# Patient Record
Sex: FEMALE | Race: WHITE | NOT HISPANIC OR LATINO | Employment: OTHER | URBAN - METROPOLITAN AREA
[De-identification: names, ages, dates, MRNs, and addresses within clinical notes are randomized per-mention and may not be internally consistent; named-entity substitution may affect disease eponyms.]

---

## 2019-05-09 ENCOUNTER — RECORDS - HEALTHEAST (OUTPATIENT)
Dept: LAB | Facility: CLINIC | Age: 74
End: 2019-05-09

## 2019-05-09 LAB
ALBUMIN SERPL-MCNC: 3.7 G/DL (ref 3.5–5)
ALP SERPL-CCNC: 92 U/L (ref 45–120)
ALT SERPL W P-5'-P-CCNC: 20 U/L (ref 0–45)
ANION GAP SERPL CALCULATED.3IONS-SCNC: 9 MMOL/L (ref 5–18)
AST SERPL W P-5'-P-CCNC: 30 U/L (ref 0–40)
BILIRUB SERPL-MCNC: 0.9 MG/DL (ref 0–1)
BUN SERPL-MCNC: 19 MG/DL (ref 8–28)
CALCIUM SERPL-MCNC: 10.2 MG/DL (ref 8.5–10.5)
CHLORIDE BLD-SCNC: 104 MMOL/L (ref 98–107)
CHOLEST SERPL-MCNC: 171 MG/DL
CO2 SERPL-SCNC: 31 MMOL/L (ref 22–31)
CREAT SERPL-MCNC: 0.89 MG/DL (ref 0.6–1.1)
ERYTHROCYTE [DISTWIDTH] IN BLOOD BY AUTOMATED COUNT: 13.2 % (ref 11–14.5)
FASTING STATUS PATIENT QL REPORTED: NORMAL
GFR SERPL CREATININE-BSD FRML MDRD: >60 ML/MIN/1.73M2
GLUCOSE BLD-MCNC: 112 MG/DL (ref 70–125)
HCT VFR BLD AUTO: 42.7 % (ref 35–47)
HDLC SERPL-MCNC: 52 MG/DL
HGB BLD-MCNC: 13.8 G/DL (ref 12–16)
LDLC SERPL CALC-MCNC: 98 MG/DL
MCH RBC QN AUTO: 30.7 PG (ref 27–34)
MCHC RBC AUTO-ENTMCNC: 32.3 G/DL (ref 32–36)
MCV RBC AUTO: 95 FL (ref 80–100)
PLATELET # BLD AUTO: 201 THOU/UL (ref 140–440)
PMV BLD AUTO: 11 FL (ref 8.5–12.5)
POTASSIUM BLD-SCNC: 3.9 MMOL/L (ref 3.5–5)
PROT SERPL-MCNC: 8 G/DL (ref 6–8)
RBC # BLD AUTO: 4.49 MILL/UL (ref 3.8–5.4)
SODIUM SERPL-SCNC: 144 MMOL/L (ref 136–145)
TRIGL SERPL-MCNC: 106 MG/DL
WBC: 7.9 THOU/UL (ref 4–11)

## 2019-05-10 LAB — 25(OH)D3 SERPL-MCNC: 10.7 NG/ML (ref 30–80)

## 2019-06-21 ENCOUNTER — RECORDS - HEALTHEAST (OUTPATIENT)
Dept: LAB | Facility: CLINIC | Age: 74
End: 2019-06-21

## 2019-06-21 LAB — TSH SERPL DL<=0.005 MIU/L-ACNC: 0.55 UIU/ML (ref 0.3–5)

## 2019-11-18 ENCOUNTER — RECORDS - HEALTHEAST (OUTPATIENT)
Dept: LAB | Facility: CLINIC | Age: 74
End: 2019-11-18

## 2019-11-19 ENCOUNTER — RECORDS - HEALTHEAST (OUTPATIENT)
Dept: LAB | Facility: CLINIC | Age: 74
End: 2019-11-19

## 2019-11-19 LAB
25(OH)D3 SERPL-MCNC: 78.6 NG/ML (ref 30–80)
ALBUMIN UR-MCNC: NEGATIVE MG/DL
APPEARANCE UR: ABNORMAL
BACTERIA #/AREA URNS HPF: ABNORMAL HPF
BILIRUB UR QL STRIP: NEGATIVE
COLOR UR AUTO: YELLOW
GLUCOSE UR STRIP-MCNC: NEGATIVE MG/DL
HGB UR QL STRIP: NEGATIVE
HYALINE CASTS #/AREA URNS LPF: ABNORMAL LPF
KETONES UR STRIP-MCNC: NEGATIVE MG/DL
LEUKOCYTE ESTERASE UR QL STRIP: ABNORMAL
MUCOUS THREADS #/AREA URNS LPF: ABNORMAL LPF
NITRATE UR QL: NEGATIVE
PH UR STRIP: 5.5 [PH] (ref 4.5–8)
RBC #/AREA URNS AUTO: ABNORMAL HPF
SP GR UR STRIP: 1.01 (ref 1–1.03)
SQUAMOUS #/AREA URNS AUTO: ABNORMAL LPF
TRANS CELLS #/AREA URNS HPF: ABNORMAL LPF
UROBILINOGEN UR STRIP-ACNC: ABNORMAL
WBC #/AREA URNS AUTO: ABNORMAL HPF
WBC CLUMPS #/AREA URNS HPF: PRESENT /[HPF]

## 2019-11-20 LAB — BACTERIA SPEC CULT: NO GROWTH

## 2020-03-02 ENCOUNTER — RECORDS - HEALTHEAST (OUTPATIENT)
Dept: LAB | Facility: CLINIC | Age: 75
End: 2020-03-02

## 2020-03-02 LAB
ANION GAP SERPL CALCULATED.3IONS-SCNC: 12 MMOL/L (ref 5–18)
BASOPHILS # BLD AUTO: 0 THOU/UL (ref 0–0.2)
BASOPHILS NFR BLD AUTO: 0 % (ref 0–2)
BUN SERPL-MCNC: 20 MG/DL (ref 8–28)
CALCIUM SERPL-MCNC: 9.9 MG/DL (ref 8.5–10.5)
CHLORIDE BLD-SCNC: 107 MMOL/L (ref 98–107)
CO2 SERPL-SCNC: 21 MMOL/L (ref 22–31)
CREAT SERPL-MCNC: 0.86 MG/DL (ref 0.6–1.1)
EOSINOPHIL # BLD AUTO: 0.2 THOU/UL (ref 0–0.4)
EOSINOPHIL NFR BLD AUTO: 2 % (ref 0–6)
ERYTHROCYTE [DISTWIDTH] IN BLOOD BY AUTOMATED COUNT: 14.4 % (ref 11–14.5)
GFR SERPL CREATININE-BSD FRML MDRD: >60 ML/MIN/1.73M2
GLUCOSE BLD-MCNC: 118 MG/DL (ref 70–125)
HCT VFR BLD AUTO: 37.1 % (ref 35–47)
HGB BLD-MCNC: 11.4 G/DL (ref 12–16)
LYMPHOCYTES # BLD AUTO: 0.9 THOU/UL (ref 0.8–4.4)
LYMPHOCYTES NFR BLD AUTO: 12 % (ref 20–40)
MCH RBC QN AUTO: 29.2 PG (ref 27–34)
MCHC RBC AUTO-ENTMCNC: 30.7 G/DL (ref 32–36)
MCV RBC AUTO: 95 FL (ref 80–100)
MONOCYTES # BLD AUTO: 0.7 THOU/UL (ref 0–0.9)
MONOCYTES NFR BLD AUTO: 9 % (ref 2–10)
NEUTROPHILS # BLD AUTO: 5.8 THOU/UL (ref 2–7.7)
NEUTROPHILS NFR BLD AUTO: 77 % (ref 50–70)
PLATELET # BLD AUTO: 196 THOU/UL (ref 140–440)
PMV BLD AUTO: 11.1 FL (ref 8.5–12.5)
POTASSIUM BLD-SCNC: 4.4 MMOL/L (ref 3.5–5)
RBC # BLD AUTO: 3.91 MILL/UL (ref 3.8–5.4)
SODIUM SERPL-SCNC: 140 MMOL/L (ref 136–145)
WBC: 7.5 THOU/UL (ref 4–11)

## 2020-06-19 ENCOUNTER — RECORDS - HEALTHEAST (OUTPATIENT)
Dept: LAB | Facility: CLINIC | Age: 75
End: 2020-06-19

## 2020-06-19 LAB
ANION GAP SERPL CALCULATED.3IONS-SCNC: 9 MMOL/L (ref 5–18)
BUN SERPL-MCNC: 31 MG/DL (ref 8–28)
CALCIUM SERPL-MCNC: 9.3 MG/DL (ref 8.5–10.5)
CHLORIDE BLD-SCNC: 106 MMOL/L (ref 98–107)
CO2 SERPL-SCNC: 26 MMOL/L (ref 22–31)
CREAT SERPL-MCNC: 1.1 MG/DL (ref 0.6–1.1)
GFR SERPL CREATININE-BSD FRML MDRD: 49 ML/MIN/1.73M2
GLUCOSE BLD-MCNC: 70 MG/DL (ref 70–125)
POTASSIUM BLD-SCNC: 4.4 MMOL/L (ref 3.5–5)
SODIUM SERPL-SCNC: 141 MMOL/L (ref 136–145)
URATE SERPL-MCNC: 5.9 MG/DL (ref 2–7.5)

## 2020-09-23 ENCOUNTER — RECORDS - HEALTHEAST (OUTPATIENT)
Dept: LAB | Facility: CLINIC | Age: 75
End: 2020-09-23

## 2020-09-25 LAB
ALBUMIN SERPL-MCNC: 3.5 G/DL (ref 3.5–5)
ALP SERPL-CCNC: 93 U/L (ref 45–120)
ALT SERPL W P-5'-P-CCNC: 13 U/L (ref 0–45)
ANION GAP SERPL CALCULATED.3IONS-SCNC: 10 MMOL/L (ref 5–18)
AST SERPL W P-5'-P-CCNC: 27 U/L (ref 0–40)
BASOPHILS # BLD AUTO: 0 THOU/UL (ref 0–0.2)
BASOPHILS NFR BLD AUTO: 0 % (ref 0–2)
BILIRUB SERPL-MCNC: 0.5 MG/DL (ref 0–1)
BUN SERPL-MCNC: 22 MG/DL (ref 8–28)
CALCIUM SERPL-MCNC: 10 MG/DL (ref 8.5–10.5)
CHLORIDE BLD-SCNC: 102 MMOL/L (ref 98–107)
CHOLEST SERPL-MCNC: 171 MG/DL
CO2 SERPL-SCNC: 29 MMOL/L (ref 22–31)
CREAT SERPL-MCNC: 1.13 MG/DL (ref 0.6–1.1)
EOSINOPHIL # BLD AUTO: 0.1 THOU/UL (ref 0–0.4)
EOSINOPHIL NFR BLD AUTO: 1 % (ref 0–6)
ERYTHROCYTE [DISTWIDTH] IN BLOOD BY AUTOMATED COUNT: 14 % (ref 11–14.5)
FASTING STATUS PATIENT QL REPORTED: ABNORMAL
GFR SERPL CREATININE-BSD FRML MDRD: 47 ML/MIN/1.73M2
GLUCOSE BLD-MCNC: 139 MG/DL (ref 70–125)
HCT VFR BLD AUTO: 37.3 % (ref 35–47)
HDLC SERPL-MCNC: 45 MG/DL
HGB BLD-MCNC: 11.8 G/DL (ref 12–16)
IMM GRANULOCYTES # BLD: 0 THOU/UL
IMM GRANULOCYTES NFR BLD: 0 %
LDLC SERPL CALC-MCNC: 102 MG/DL
LYMPHOCYTES # BLD AUTO: 0.8 THOU/UL (ref 0.8–4.4)
LYMPHOCYTES NFR BLD AUTO: 11 % (ref 20–40)
MCH RBC QN AUTO: 29.9 PG (ref 27–34)
MCHC RBC AUTO-ENTMCNC: 31.6 G/DL (ref 32–36)
MCV RBC AUTO: 94 FL (ref 80–100)
MONOCYTES # BLD AUTO: 0.5 THOU/UL (ref 0–0.9)
MONOCYTES NFR BLD AUTO: 7 % (ref 2–10)
NEUTROPHILS # BLD AUTO: 5.7 THOU/UL (ref 2–7.7)
NEUTROPHILS NFR BLD AUTO: 80 % (ref 50–70)
PLATELET # BLD AUTO: 225 THOU/UL (ref 140–440)
PMV BLD AUTO: 11.1 FL (ref 8.5–12.5)
POTASSIUM BLD-SCNC: 4.3 MMOL/L (ref 3.5–5)
PROT SERPL-MCNC: 8.1 G/DL (ref 6–8)
RBC # BLD AUTO: 3.95 MILL/UL (ref 3.8–5.4)
SODIUM SERPL-SCNC: 141 MMOL/L (ref 136–145)
TRIGL SERPL-MCNC: 118 MG/DL
WBC: 7.1 THOU/UL (ref 4–11)

## 2020-09-28 LAB — 25(OH)D3 SERPL-MCNC: 123 NG/ML (ref 30–80)

## 2021-06-30 ENCOUNTER — RECORDS - HEALTHEAST (OUTPATIENT)
Dept: LAB | Facility: CLINIC | Age: 76
End: 2021-06-30

## 2021-07-01 LAB
25(OH)D3 SERPL-MCNC: 26.8 NG/ML (ref 30–80)
ALBUMIN SERPL-MCNC: 2.7 G/DL (ref 3.5–5)
ALP SERPL-CCNC: 95 U/L (ref 45–120)
ALT SERPL W P-5'-P-CCNC: <9 U/L (ref 0–45)
ANION GAP SERPL CALCULATED.3IONS-SCNC: 12 MMOL/L (ref 5–18)
AST SERPL W P-5'-P-CCNC: 15 U/L (ref 0–40)
BILIRUB DIRECT SERPL-MCNC: 0.1 MG/DL
BILIRUB SERPL-MCNC: 0.3 MG/DL (ref 0–1)
BUN SERPL-MCNC: 12 MG/DL (ref 8–28)
CALCIUM SERPL-MCNC: 9.2 MG/DL (ref 8.5–10.5)
CHLORIDE BLD-SCNC: 107 MMOL/L (ref 98–107)
CO2 SERPL-SCNC: 22 MMOL/L (ref 22–31)
CREAT SERPL-MCNC: 0.72 MG/DL (ref 0.6–1.1)
GFR SERPL CREATININE-BSD FRML MDRD: >60 ML/MIN/1.73M2
GLUCOSE BLD-MCNC: 94 MG/DL (ref 70–125)
POTASSIUM BLD-SCNC: 3.3 MMOL/L (ref 3.5–5)
PROT SERPL-MCNC: 6.6 G/DL (ref 6–8)
SODIUM SERPL-SCNC: 141 MMOL/L (ref 136–145)
TSH SERPL DL<=0.005 MIU/L-ACNC: 1.17 UIU/ML (ref 0.3–5)
VIT B12 SERPL-MCNC: 312 PG/ML (ref 213–816)

## 2021-07-02 ENCOUNTER — RECORDS - HEALTHEAST (OUTPATIENT)
Dept: LAB | Facility: CLINIC | Age: 76
End: 2021-07-02

## 2021-07-06 LAB — POTASSIUM BLD-SCNC: 3.8 MMOL/L (ref 3.5–5)

## 2021-07-15 ENCOUNTER — HOSPITAL ENCOUNTER (INPATIENT)
Facility: HOSPITAL | Age: 76
LOS: 3 days | Discharge: NURSING, REHAB OR LONG-TERM CARE | DRG: 389 | End: 2021-07-19
Attending: EMERGENCY MEDICINE | Admitting: HOSPITALIST
Payer: COMMERCIAL

## 2021-07-15 ENCOUNTER — HOSPITAL ENCOUNTER (EMERGENCY)
Dept: CT IMAGING | Facility: HOSPITAL | Age: 76
DRG: 389 | End: 2021-07-15
Attending: EMERGENCY MEDICINE
Payer: COMMERCIAL

## 2021-07-15 DIAGNOSIS — F02.80 ALZHEIMER'S DEMENTIA WITHOUT BEHAVIORAL DISTURBANCE, UNSPECIFIED TIMING OF DEMENTIA ONSET: ICD-10-CM

## 2021-07-15 DIAGNOSIS — K59.01 SLOW TRANSIT CONSTIPATION: ICD-10-CM

## 2021-07-15 DIAGNOSIS — R11.10 NON-INTRACTABLE VOMITING, PRESENCE OF NAUSEA NOT SPECIFIED, UNSPECIFIED VOMITING TYPE: ICD-10-CM

## 2021-07-15 DIAGNOSIS — G30.9 ALZHEIMER'S DEMENTIA WITHOUT BEHAVIORAL DISTURBANCE, UNSPECIFIED TIMING OF DEMENTIA ONSET: ICD-10-CM

## 2021-07-15 DIAGNOSIS — I10 ESSENTIAL HYPERTENSION: Primary | ICD-10-CM

## 2021-07-15 DIAGNOSIS — N39.0 URINARY TRACT INFECTION WITHOUT HEMATURIA, SITE UNSPECIFIED: ICD-10-CM

## 2021-07-15 LAB
ALBUMIN SERPL-MCNC: 3.3 G/DL (ref 3.5–5)
ALP SERPL-CCNC: 119 U/L (ref 45–120)
ALT SERPL W P-5'-P-CCNC: 9 U/L (ref 0–45)
ANION GAP SERPL CALCULATED.3IONS-SCNC: 10 MMOL/L (ref 5–18)
AST SERPL W P-5'-P-CCNC: 15 U/L (ref 0–40)
BILIRUB DIRECT SERPL-MCNC: 0.2 MG/DL
BILIRUB SERPL-MCNC: 0.8 MG/DL (ref 0–1)
BUN SERPL-MCNC: 12 MG/DL (ref 8–28)
CALCIUM SERPL-MCNC: 9.6 MG/DL (ref 8.5–10.5)
CHLORIDE BLD-SCNC: 105 MMOL/L (ref 98–107)
CO2 SERPL-SCNC: 22 MMOL/L (ref 22–31)
CREAT SERPL-MCNC: 0.7 MG/DL (ref 0.6–1.1)
ERYTHROCYTE [DISTWIDTH] IN BLOOD BY AUTOMATED COUNT: 15 % (ref 10–15)
GFR SERPL CREATININE-BSD FRML MDRD: 85 ML/MIN/1.73M2
GLUCOSE BLD-MCNC: 194 MG/DL (ref 70–125)
HCT VFR BLD AUTO: 41.1 % (ref 35–47)
HGB BLD-MCNC: 13.7 G/DL (ref 11.7–15.7)
LIPASE SERPL-CCNC: 18 U/L (ref 0–52)
MCH RBC QN AUTO: 28.7 PG (ref 26.5–33)
MCHC RBC AUTO-ENTMCNC: 33.3 G/DL (ref 31.5–36.5)
MCV RBC AUTO: 86 FL (ref 78–100)
PLATELET # BLD AUTO: 294 10E3/UL (ref 150–450)
POTASSIUM BLD-SCNC: 3.2 MMOL/L (ref 3.5–5)
PROT SERPL-MCNC: 7.5 G/DL (ref 6–8)
RBC # BLD AUTO: 4.78 10E6/UL (ref 3.8–5.2)
SODIUM SERPL-SCNC: 137 MMOL/L (ref 136–145)
WBC # BLD AUTO: 14.7 10E3/UL (ref 4–11)

## 2021-07-15 PROCEDURE — 96366 THER/PROPH/DIAG IV INF ADDON: CPT

## 2021-07-15 PROCEDURE — 96367 TX/PROPH/DG ADDL SEQ IV INF: CPT

## 2021-07-15 PROCEDURE — 96361 HYDRATE IV INFUSION ADD-ON: CPT

## 2021-07-15 PROCEDURE — 84295 ASSAY OF SERUM SODIUM: CPT | Performed by: EMERGENCY MEDICINE

## 2021-07-15 PROCEDURE — 36592 COLLECT BLOOD FROM PICC: CPT | Performed by: EMERGENCY MEDICINE

## 2021-07-15 PROCEDURE — 99285 EMERGENCY DEPT VISIT HI MDM: CPT | Mod: 25

## 2021-07-15 PROCEDURE — 85027 COMPLETE CBC AUTOMATED: CPT | Performed by: EMERGENCY MEDICINE

## 2021-07-15 PROCEDURE — 250N000011 HC RX IP 250 OP 636: Performed by: EMERGENCY MEDICINE

## 2021-07-15 PROCEDURE — 74177 CT ABD & PELVIS W/CONTRAST: CPT

## 2021-07-15 PROCEDURE — 96365 THER/PROPH/DIAG IV INF INIT: CPT

## 2021-07-15 PROCEDURE — 83690 ASSAY OF LIPASE: CPT | Performed by: EMERGENCY MEDICINE

## 2021-07-15 PROCEDURE — 82374 ASSAY BLOOD CARBON DIOXIDE: CPT | Performed by: EMERGENCY MEDICINE

## 2021-07-15 PROCEDURE — 36415 COLL VENOUS BLD VENIPUNCTURE: CPT | Performed by: EMERGENCY MEDICINE

## 2021-07-15 PROCEDURE — 82248 BILIRUBIN DIRECT: CPT | Performed by: EMERGENCY MEDICINE

## 2021-07-15 RX ORDER — IOPAMIDOL 755 MG/ML
100 INJECTION, SOLUTION INTRAVASCULAR ONCE
Status: COMPLETED | OUTPATIENT
Start: 2021-07-15 | End: 2021-07-15

## 2021-07-15 RX ADMIN — IOPAMIDOL 100 ML: 755 INJECTION, SOLUTION INTRAVENOUS at 22:47

## 2021-07-16 PROBLEM — R11.10 NON-INTRACTABLE VOMITING, PRESENCE OF NAUSEA NOT SPECIFIED, UNSPECIFIED VOMITING TYPE: Status: ACTIVE | Noted: 2021-07-16

## 2021-07-16 LAB
ALBUMIN UR-MCNC: 30 MG/DL
APPEARANCE UR: ABNORMAL
BACTERIA #/AREA URNS HPF: ABNORMAL /HPF
BILIRUB UR QL STRIP: NEGATIVE
COLOR UR AUTO: YELLOW
GLUCOSE UR STRIP-MCNC: NEGATIVE MG/DL
HGB UR QL STRIP: ABNORMAL
KETONES UR STRIP-MCNC: NEGATIVE MG/DL
LACTATE SERPL-SCNC: 0.8 MMOL/L (ref 0.7–2)
LEUKOCYTE ESTERASE UR QL STRIP: ABNORMAL
NITRATE UR QL: NEGATIVE
PH UR STRIP: 8 [PH] (ref 5–7)
RBC URINE: 86 /HPF
SARS-COV-2 RNA RESP QL NAA+PROBE: NEGATIVE
SP GR UR STRIP: 1.04 (ref 1–1.03)
UROBILINOGEN UR STRIP-MCNC: <2 MG/DL
WBC CLUMPS #/AREA URNS HPF: PRESENT /HPF
WBC URINE: 41 /HPF

## 2021-07-16 PROCEDURE — 250N000011 HC RX IP 250 OP 636: Performed by: INTERNAL MEDICINE

## 2021-07-16 PROCEDURE — 258N000003 HC RX IP 258 OP 636: Performed by: STUDENT IN AN ORGANIZED HEALTH CARE EDUCATION/TRAINING PROGRAM

## 2021-07-16 PROCEDURE — 87086 URINE CULTURE/COLONY COUNT: CPT | Performed by: EMERGENCY MEDICINE

## 2021-07-16 PROCEDURE — 83605 ASSAY OF LACTIC ACID: CPT | Performed by: INTERNAL MEDICINE

## 2021-07-16 PROCEDURE — 99222 1ST HOSP IP/OBS MODERATE 55: CPT | Performed by: HOSPITALIST

## 2021-07-16 PROCEDURE — 250N000011 HC RX IP 250 OP 636: Performed by: HOSPITALIST

## 2021-07-16 PROCEDURE — 81001 URINALYSIS AUTO W/SCOPE: CPT | Performed by: EMERGENCY MEDICINE

## 2021-07-16 PROCEDURE — 36415 COLL VENOUS BLD VENIPUNCTURE: CPT | Performed by: INTERNAL MEDICINE

## 2021-07-16 PROCEDURE — 120N000001 HC R&B MED SURG/OB

## 2021-07-16 PROCEDURE — 250N000011 HC RX IP 250 OP 636: Performed by: EMERGENCY MEDICINE

## 2021-07-16 PROCEDURE — 258N000001 HC RX 258: Performed by: EMERGENCY MEDICINE

## 2021-07-16 PROCEDURE — 99221 1ST HOSP IP/OBS SF/LOW 40: CPT | Performed by: SURGERY

## 2021-07-16 PROCEDURE — 87635 SARS-COV-2 COVID-19 AMP PRB: CPT | Performed by: STUDENT IN AN ORGANIZED HEALTH CARE EDUCATION/TRAINING PROGRAM

## 2021-07-16 RX ORDER — ONDANSETRON 2 MG/ML
4 INJECTION INTRAMUSCULAR; INTRAVENOUS EVERY 6 HOURS PRN
Status: DISCONTINUED | OUTPATIENT
Start: 2021-07-16 | End: 2021-07-19 | Stop reason: HOSPADM

## 2021-07-16 RX ORDER — ACETAMINOPHEN 325 MG/1
650 TABLET ORAL EVERY 4 HOURS PRN
Status: DISCONTINUED | OUTPATIENT
Start: 2021-07-16 | End: 2021-07-17

## 2021-07-16 RX ORDER — NITROGLYCERIN 0.4 MG/1
0.4 TABLET SUBLINGUAL EVERY 5 MIN PRN
COMMUNITY

## 2021-07-16 RX ORDER — LIDOCAINE 40 MG/G
CREAM TOPICAL
Status: DISCONTINUED | OUTPATIENT
Start: 2021-07-16 | End: 2021-07-19 | Stop reason: HOSPADM

## 2021-07-16 RX ORDER — DEXTROSE MONOHYDRATE, SODIUM CHLORIDE, AND POTASSIUM CHLORIDE 50; 2.98; 4.5 G/1000ML; G/1000ML; G/1000ML
INJECTION, SOLUTION INTRAVENOUS CONTINUOUS
Status: DISCONTINUED | OUTPATIENT
Start: 2021-07-16 | End: 2021-07-16 | Stop reason: CLARIF

## 2021-07-16 RX ORDER — ONDANSETRON 4 MG/1
4 TABLET, ORALLY DISINTEGRATING ORAL EVERY 6 HOURS PRN
Status: DISCONTINUED | OUTPATIENT
Start: 2021-07-16 | End: 2021-07-19 | Stop reason: HOSPADM

## 2021-07-16 RX ORDER — CEFTRIAXONE 1 G/1
1 INJECTION, POWDER, FOR SOLUTION INTRAMUSCULAR; INTRAVENOUS EVERY 24 HOURS
Status: DISCONTINUED | OUTPATIENT
Start: 2021-07-16 | End: 2021-07-19 | Stop reason: HOSPADM

## 2021-07-16 RX ORDER — DONEPEZIL HYDROCHLORIDE 5 MG/1
5 TABLET, FILM COATED ORAL EVERY MORNING
COMMUNITY
End: 2021-10-13

## 2021-07-16 RX ORDER — MEMANTINE HYDROCHLORIDE 10 MG/1
20 TABLET ORAL EVERY MORNING
Status: ON HOLD | COMMUNITY
End: 2021-07-19

## 2021-07-16 RX ORDER — SERTRALINE HYDROCHLORIDE 100 MG/1
100 TABLET, FILM COATED ORAL DAILY
COMMUNITY

## 2021-07-16 RX ORDER — POLYETHYLENE GLYCOL 3350 17 G/17G
17 POWDER, FOR SOLUTION ORAL DAILY PRN
Status: ON HOLD | COMMUNITY
End: 2021-07-19

## 2021-07-16 RX ORDER — SODIUM CHLORIDE AND POTASSIUM CHLORIDE 150; 900 MG/100ML; MG/100ML
INJECTION, SOLUTION INTRAVENOUS CONTINUOUS
Status: DISCONTINUED | OUTPATIENT
Start: 2021-07-16 | End: 2021-07-16

## 2021-07-16 RX ORDER — ACETAMINOPHEN 500 MG
1000 TABLET ORAL EVERY 8 HOURS PRN
COMMUNITY

## 2021-07-16 RX ORDER — SODIUM CHLORIDE 9 MG/ML
INJECTION, SOLUTION INTRAVENOUS ONCE
Status: COMPLETED | OUTPATIENT
Start: 2021-07-16 | End: 2021-07-16

## 2021-07-16 RX ORDER — ASPIRIN 81 MG/1
81 TABLET, CHEWABLE ORAL DAILY
COMMUNITY

## 2021-07-16 RX ORDER — HYDRALAZINE HYDROCHLORIDE 20 MG/ML
10 INJECTION INTRAMUSCULAR; INTRAVENOUS EVERY 6 HOURS PRN
Status: DISCONTINUED | OUTPATIENT
Start: 2021-07-16 | End: 2021-07-19 | Stop reason: HOSPADM

## 2021-07-16 RX ADMIN — SODIUM CHLORIDE: 9 INJECTION, SOLUTION INTRAVENOUS at 00:48

## 2021-07-16 RX ADMIN — ENOXAPARIN SODIUM 40 MG: 100 INJECTION SUBCUTANEOUS at 18:00

## 2021-07-16 RX ADMIN — POTASSIUM CHLORIDE AND SODIUM CHLORIDE: 900; 150 INJECTION, SOLUTION INTRAVENOUS at 06:03

## 2021-07-16 RX ADMIN — POTASSIUM CHLORIDE: 2 INJECTION, SOLUTION, CONCENTRATE INTRAVENOUS at 10:29

## 2021-07-16 RX ADMIN — POTASSIUM CHLORIDE: 2 INJECTION, SOLUTION, CONCENTRATE INTRAVENOUS at 23:59

## 2021-07-16 RX ADMIN — CEFTRIAXONE SODIUM 1 G: 1 INJECTION, POWDER, FOR SOLUTION INTRAMUSCULAR; INTRAVENOUS at 19:07

## 2021-07-16 ASSESSMENT — ACTIVITIES OF DAILY LIVING (ADL): DEPENDENT_IADLS:: CLEANING;COOKING;LAUNDRY;SHOPPING;MEAL PREPARATION;MEDICATION MANAGEMENT;TRANSPORTATION

## 2021-07-16 NOTE — ED NOTES
Pt's sister updated that we will be boarding the patient here tonight, will call with updates.  Pt's room lights dimmed to facilitate sleep.

## 2021-07-16 NOTE — PROGRESS NOTES
Patient was seen and examined. She appeared confused and not able to tell how she feels. She appears comfortable. CT scan reveals moderate to marked diffuse gaseous distention of the entire colon, all the way to the inferior rectum. This could either represent a colonic ileus or a distal colonic obstruction without visualized cause at the level of the inferior rectum.    Will keep NPO and IVF. Surgery input pending. Rest per initial H&P.    [facility] medicine service  Eli Sosa MD

## 2021-07-16 NOTE — CONSULTS
[facility] CONSULTATION    Liza GUTIÉRREZ Doug   [address] [place] [address]  76 year old female  Admission Date/Time: 7/15/2021  9:21 PM    Primary Care Provider:  Jenna Montoya MD    Requesting Physician: No att. providers found      CHIEF COMPLAINT:   Nausea and vomiting.    REASON FOR THE CONSULT: Nausea vomiting, CT showing colonic distention.    HPI:     This is a severely demented woman who is unable to answer any questions who developed nausea and vomiting and was transferred for a memory care unit.  CT did show moderate distention.  The patient has no complaints of pain.  I am able to push on her belly without any reaction.  Bowel sounds are positive.  She is very sedentary at her nursing home facility.  It appears she does have a UTI.  Her potassium is low.  We are unable to get a history of what her bowel movements have been like.  I am unable to get any further history from the patient.  Her white count is elevated 14,700.  Potassium is 3.2.  UA does show bacteria and white cells.    REVIEW OF SYSTEMS: 13 point review of systems is negative except that noted above.    PAST MEDICAL HISTORY: History reviewed. No pertinent past medical history.    PAST SURGICAL HISTORY: History reviewed. No pertinent surgical history.    FAMILY HISTORY: History reviewed. No pertinent family history.    SOCIAL HISTORY:   Social History     Tobacco Use     Smoking status: Not on file   Substance Use Topics     Alcohol use: Not on file        MEDS:  Medications Prior to Admission   Medication Sig Dispense Refill Last Dose     aspirin (ASA) 81 MG chewable tablet Take 81 mg by mouth daily   7/15/2021 at am     donepezil (ARICEPT) 5 MG tablet Take 5 mg by mouth every morning   7/15/2021 at am     melatonin 5 MG tablet Take 5 mg by mouth At Bedtime   7/14/2021 at PM     memantine (NAMENDA) 10 MG tablet Take 20 mg by mouth every morning   7/15/2021 at am     sertraline (ZOLOFT) 100 MG tablet Take 100 mg  by mouth daily   7/15/2021 at am     acetaminophen (TYLENOL) 500 MG tablet Take 1,000 mg by mouth every 8 hours as needed for mild pain   Unknown at Unknown time     nitroGLYcerin (NITROSTAT) 0.4 MG sublingual tablet Place 0.4 mg under the tongue every 5 minutes as needed for chest pain For chest pain place 1 tablet under the tongue every 5 minutes for 3 doses. If symptoms persist 5 minutes after 1st dose call 911.   Unknown at Unknown time     polyethylene glycol (MIRALAX) 17 g packet Take 17 g by mouth daily as needed for constipation   Unknown at Unknown time       ALLERGIES/SENSITIVITIES: Patient has no known allergies.    MEDICATIONS:  No current outpatient medications on file.       PHYSICAL EXAM:  Temp:  [98.7  F (37.1  C)-99.3  F (37.4  C)] 98.7  F (37.1  C)  Pulse:  [57-82] 62  Resp:  [18-22] 22  BP: (128-236)/() 173/72  SpO2:  [85 %-99 %] 94 %  There is no height or weight on file to calculate BMI.  GEN: Well developed, well nourished 76 year old in no acute distress.  HEENT: sclera anicteric, moist mucous membranes.   LYMPH: No cervical lymphadenopathy  PULM: Nonlabored breathing. Breath sounds equal.   CARDIO: Regular rate  GI: Non-distended. Soft.  Non-tender to palpation.  No guarding. No rebound tenderness.  EXT: warm, no lower extremity edema  NEURO: Alert. No focal defects.   PSYCH: Mental status appropriate, mood and affect normal.    SKIN: No rashes  MSK: No joint abnormalities    LABORATORY DATA:  CBC RESULTS:   Recent Labs   Lab Test 07/15/21  2154   WBC 14.7*   RBC 4.78   HGB 13.7   HCT 41.1   MCV 86   MCH 28.7   MCHC 33.3   RDW 15.0           CMP Results:   Recent Labs   Lab Test 07/15/21  2154      POTASSIUM 3.2*   CHLORIDE 105   CO2 22   ANIONGAP 10   *   BUN 12   CR 0.70   BILITOTAL 0.8   ALKPHOS 119   ALT 9   AST 15        INR Results: No results for input(s): INR in the last [id number] hours.       RELEVANT IMAGING:      Reviewed    ASSESSMENT:     Mild ileus in a  patient with UTI and decreased potassium.  She is also immobile.  No acute abdomen.  Would treat the UTI and potassium and continue the change the position of the patient to make sure she is on the left side, right side, back and sitting in a chair.  Continue to follow.  If she decompensates at all we can intervene.    PLAN:    The above               Jay Rank  Thank you for the opportunity to participate in the care of this patient.   Please feel free to call me with any questions or concerns.  Phone number [phone].            CC: [facility], Jenna Montoya MD

## 2021-07-16 NOTE — PROGRESS NOTES
Patient admitted in the unit from ED at 1330 via cart.   Alert. Unable to assess orientation. Incomprehensible, garbled words.   Placed on 2L O2 per nasal cannula - SAO2 85% --> 94%. VSS, except for BP.  Resistive with cares - screams and very stiff. Consolable.  x1 large incontinent urine, x1 incontinent stool. Purewick applied.  NPO. IV fluids infusing.   Triggered sepsis protocol - ordered, ongoing.  Andrea Brice, RN  [id number]

## 2021-07-16 NOTE — PHARMACY-ADMISSION MEDICATION HISTORY
Pharmacy Note - Admission Medication History    Pertinent Provider Information: patient completed a scheduled course of loperamid on 7/15/21     ______________________________________________________________________    Prior To Admission (PTA) med list completed and updated in EMR.       Prior to Admission Medications   Prescriptions Last Dose Informant Patient Reported? Taking?   acetaminophen (TYLENOL) 500 MG tablet Unknown at Unknown time  Yes No   Sig: Take 1,000 mg by mouth every 8 hours as needed for mild pain   aspirin (ASA) 81 MG chewable tablet 7/15/2021 at am  Yes Yes   Sig: Take 81 mg by mouth daily   donepezil (ARICEPT) 5 MG tablet 7/15/2021 at am  Yes Yes   Sig: Take 5 mg by mouth every morning   melatonin 5 MG tablet 7/14/2021 at PM  Yes Yes   Sig: Take 5 mg by mouth At Bedtime   memantine (NAMENDA) 10 MG tablet 7/15/2021 at am  Yes Yes   Sig: Take 20 mg by mouth every morning   nitroGLYcerin (NITROSTAT) 0.4 MG sublingual tablet Unknown at Unknown time  Yes No   Sig: Place 0.4 mg under the tongue every 5 minutes as needed for chest pain For chest pain place 1 tablet under the tongue every 5 minutes for 3 doses. If symptoms persist 5 minutes after 1st dose call 911.   polyethylene glycol (MIRALAX) 17 g packet Unknown at Unknown time  Yes No   Sig: Take 17 g by mouth daily as needed for constipation   sertraline (ZOLOFT) 100 MG tablet 7/15/2021 at am  Yes Yes   Sig: Take 100 mg by mouth daily      Facility-Administered Medications: None       Information source(s): Facility (U/NH/) medication list/MAR  Method of interview communication: N/A    Summary of Changes to PTA Med List  New: all entered new    Patient was asked about OTC/herbal products specifically.  PTA med list reflects this.    In the past week, patient estimated taking medication this percent of the time:  greater than 90%.    Allergies were reviewed, assessed, and updated with the patient.      Patient does not use any multi-dose  medications prior to admission.    The information provided in this note is only as accurate as the sources available at the time of the update(s).    Thank you for the opportunity to participate in the care of this patient.    Mary Deleon RPH  7/16/2021 7:25 AM

## 2021-07-16 NOTE — CONSULTS
Care Management Initial Consult    General Information  Assessment completed with: VM-chart review,    Type of CM/SW Visit: Initial Assessment    Primary Care Provider verified and updated as needed: Yes   Readmission within the last 30 days: no previous admission in last 30 days         Advance Care Planning: Advance Care Planning Reviewed: other (comment) (POLST in the paper chart)          Communication Assessment  Patient's communication style: spoken language (English or Bilingual)             Cognitive  Cognitive/Neuro/Behavioral: orientation (alzheimers hx)                      Living Environment:   People in home:       Current living Arrangements: extended care facility  Name of Facility: [facility]   Able to return to prior arrangements: yes       Family/Social Support:  Care provided by: other (see comments) (NH staff)  Provides care for: no one  Marital Status: Single  Facility resident(s)/Staff, Other (specify) (sisters, niece)          Description of Support System: Supportive         Current Resources:   Patient receiving home care services: No     Community Resources: Skilled Nursing Facility ([facility])  Equipment currently used at home:    Supplies currently used at home:      Employment/Financial:  Employment Status: retired        Financial Concerns:     Referral to Financial Counselor: No       Lifestyle & Psychosocial Needs:  Social Determinants of Health     Tobacco Use:      Smoking Tobacco Use:      Smokeless Tobacco Use:    Alcohol Use:      Frequency of Alcohol Consumption:      Average Number of Drinks:      Frequency of Binge Drinking:    Financial Resource Strain:      Difficulty of Paying Living Expenses:    Food Insecurity:      Worried About Running Out of Food in the Last Year:      Ran Out of Food in the Last Year:    Transportation Needs:      Lack of Transportation (Medical):      Lack of Transportation (Non-Medical):    Physical Activity:      Days  of Exercise per Week:      Minutes of Exercise per Session:    Stress:      Feeling of Stress :    Social Connections:      Frequency of Communication with Friends and Family:      Frequency of Social Gatherings with Friends and Family:      Attends Yazdanism Services:      Active Member of Clubs or Organizations:      Attends Club or Organization Meetings:      Marital Status:    Intimate Partner Violence:      Fear of Current or Ex-Partner:      Emotionally Abused:      Physically Abused:      Sexually Abused:    Depression:      PHQ-2 Score:    Housing Stability:      Unable to Pay for Housing in the Last Year:      Number of Places Lived in the Last Year:      Unstable Housing in the Last Year:        Functional Status:  Prior to admission patient needed assistance:   Dependent ADLs:: Bathing, Dressing, Eating, Grooming, Incontinence, Transfers, Toileting  Dependent IADLs:: Cleaning, Cooking, Laundry, Shopping, Meal Preparation, Medication Management, Transportation       Mental Health Status:  Mental Health Status: Past Concern  Mental Health Management: Medication    Chemical Dependency Status:                Values/Beliefs:  Spiritual, Cultural Beliefs, Yazdanism Practices, Values that affect care:                 Additional Information:  Liza lives in [facility] and will need to return at discharge. She has a history of Alzheimer's and is total cares at baseline.    [facility] transport at discharge.    Luisa Gonzalez RN

## 2021-07-16 NOTE — ED NOTES
Lydia, patient's sister and POA updated regarding patient's status and the plan of care.  Her contact information is [phone].

## 2021-07-16 NOTE — ED PROVIDER NOTES
eMERGENCY dEPARTMENT Signout NOTE      Patient was signed out to me by Dr. Akbar at 7:30 AM.      HPI:       Patient is boarding in the ER pending admission.      Follow up needed: nothing    LABS  Pertinent lab results reviewed in chart.  Results for orders placed or performed during the hospital encounter of 07/15/21   CT Abdomen Pelvis w Contrast    Impression    IMPRESSION:   1. Moderate to marked diffuse gaseous distention of the entire colon, all the way to the inferior rectum. This could either represent a colonic ileus or a distal colonic obstruction without visualized cause at the level of the inferior rectum.  2. No other acute abnormality identified in the abdomen or pelvis.   CBC with platelets   Result Value Ref Range    WBC Count 14.7 (H) 4.0 - 11.0 10e3/uL    RBC Count 4.78 3.80 - 5.20 10e6/uL    Hemoglobin 13.7 11.7 - 15.7 g/dL    Hematocrit 41.1 35.0 - 47.0 %    MCV 86 78 - 100 fL    MCH 28.7 26.5 - 33.0 pg    MCHC 33.3 31.5 - 36.5 g/dL    RDW 15.0 10.0 - 15.0 %    Platelet Count 294 150 - 450 10e3/uL   Basic metabolic panel   Result Value Ref Range    Sodium 137 136 - 145 mmol/L    Potassium 3.2 (L) 3.5 - 5.0 mmol/L    Chloride 105 98 - 107 mmol/L    Carbon Dioxide (CO2) 22 22 - 31 mmol/L    Anion Gap 10 5 - 18 mmol/L    Urea Nitrogen 12 8 - 28 mg/dL    Creatinine 0.70 0.60 - 1.10 mg/dL    Calcium 9.6 8.5 - 10.5 mg/dL    Glucose 194 (H) 70 - 125 mg/dL    GFR Estimate 85 >60 mL/min/1.73m2   Hepatic function panel   Result Value Ref Range    Bilirubin Total 0.8 0.0 - 1.0 mg/dL    Bilirubin Direct 0.2 <=0.5 mg/dL    Protein Total 7.5 6.0 - 8.0 g/dL    Albumin 3.3 (L) 3.5 - 5.0 g/dL    Alkaline Phosphatase 119 45 - 120 U/L    AST 15 0 - 40 U/L    ALT 9 0 - 45 U/L   Result Value Ref Range    Lipase 18 0 - 52 U/L   UA with Microscopic reflex to Culture    Specimen: Urethra; Urine   Result Value Ref Range    Color Urine Yellow Colorless, Straw, Light Yellow, Yellow    Appearance Urine Cloudy (A) Clear     Glucose Urine Negative Negative mg/dL    Bilirubin Urine Negative Negative    Ketones Urine Negative Negative mg/dL    Specific Gravity Urine 1.039 (H) 1.001 - 1.030    Blood Urine 0.2 mg/dL (A) Negative    pH Urine 8.0 (H) 5.0 - 7.0    Protein Albumin Urine 30  (A) Negative mg/dL    Urobilinogen Urine <2.0 <2.0 mg/dL    Nitrite Urine Negative Negative    Leukocyte Esterase Urine 500 Meghna/uL (A) Negative    Bacteria Urine Many (A) None Seen /HPF    WBC Clumps Urine Present (A) None Seen /HPF    RBC Urine 86 (H) <=2 /HPF    WBC Urine 41 (H) <=5 /HPF   SARS-COV2 (COVID-19) Virus RT-PCR    Specimen: Nasopharyngeal; Swab   Result Value Ref Range    SARS CoV2 PCR Negative Negative       RADIOLOGY  CT Abdomen Pelvis w Contrast   Final Result   IMPRESSION:    1. Moderate to marked diffuse gaseous distention of the entire colon, all the way to the inferior rectum. This could either represent a colonic ileus or a distal colonic obstruction without visualized cause at the level of the inferior rectum.   2. No other acute abnormality identified in the abdomen or pelvis.              ED COURSE & MEDICAL DECISION MAKING    Pertinent Labs and Imaging reviewed (see chart for details)      ED Course as of Jul 16 1128   Thu Jul 15, 2021   2255 Sign out received. 77 y/o F from nursing home. Is nonverbal at baseline mental status. Vomited and abdomen maybe more distended. Labs with leukocytosis, mild hypokalemia. Pending UA and CT abd/pelvis. If reassuring likely plan for discharge.       2340 CT scan with: . Moderate to marked diffuse gaseous distention of the entire colon, all the way to the inferior rectum. This could either represent a colonic ileus or a distal colonic obstruction without visualized cause at the level of the inferior rectum    Will discuss with general surgery      2350 Discussed with general surgery. Will do rectal exam to see if can find rectal obstruction, if not will admit for medical management (NPO,  fluids).      2358 Rectal exam done. Had loose brown stool in diaper. No masses or hard stool or other obstruction on rectal exam. Patient's sister updated on plan. Has been comfortable here. No additional vomiting.      Fri Jul 16, 2021   0043 No beds available elsewhere. Plan to board in the ED.      0046 Discussed with hospitalist who agrees with admission here. Patient's sister is in agreement with plan. Will be signed out to Dr. Adams in AM pending admission        11:20 AM Dr. Lozoya, surgery, examined patient at beside.    Surgery saw patient and recommended GI perform decompression.  GI consult placed      FINAL IMPRESSION          Diagnoses       Codes Comments    Non-intractable vomiting, presence of nausea not specified, unspecified vomiting type     R11.10           Rober Adams MD  7/16/2021  11:19 AM           oRber Adams MD  07/16/21 1128

## 2021-07-16 NOTE — ED TRIAGE NOTES
Elderly female BIBA ([facility]) with emesis x 5.  Patient has Alzheimer's and is unable to communicate any issues to this writer.  EMS gave Zofran 4mg IV and no emesis since that time.  Patient resides at [facility].

## 2021-07-16 NOTE — ED NOTES
Bed: JNED-18  Expected date: 7/15/21  Expected time: 9:18 PM  Means of arrival: Ambulance  Comments:  Nissa; 76F emesis

## 2021-07-16 NOTE — ED PROVIDER NOTES
EMERGENCY DEPARTMENT ENCOUnter      NAME: Liza Camacho  AGE: 76 year old female  YOB: 1945  MRN: [id number]  EVALUATION DATE & TIME: 7/15/2021  9:21 PM    PCP: No primary care provider on file.    ED PROVIDER: Socrates Herbert DO      Chief Complaint   Patient presents with     Vomiting         FINAL IMPRESSION:  1. Non-intractable vomiting, presence of nausea not specified, unspecified vomiting type          ED COURSE & MEDICAL DECISION MAKIN:42 PM I met with patient to gather history and perform initial exam. ED course and treatment plan was discussed.    The patient presented emergency department tonight after having several episodes of vomiting at her nursing facility.  The patient is essentially nonverbal at baseline.  She is here with her sister.  Her sister felt that she seemed to have some abdominal discomfort and bloating.  She has no obvious tenderness on exam.  Given her symptoms tonight, a CT has been ordered which is pending.  Initial laboratory testing is notable for mild leukocytosis but no other acute findings.  Case has been handed off to Dr. Hagan.  See her note for further details.      At the conclusion of the encounter I discussed the results of all of the tests and the disposition. The questions were answered. The patient or family acknowledged understanding and was agreeable with the care plan.          =================================================================    HPI        Liza Camacho is a 76 year old female with a pertinent history of dementia and alzheimers who presents to this ED via EMS for evaluation of emesis.    Patient's sister reports patient had 3 episodes of emesis today at her nursing home [facility]. Unsure if she has abdominal pain. Notes patient looks bloated. Had contacted on-call MD who assumed it might be C-Diff who instructed to take patient to ED. States that she is usually constipated and takes senna regularly. She is  "nonverbal at baseline. Has had no previous similar experience. Has had a heart stent placed. In April, she had just moved into her nursing home and \"quit walking\", she was mobile prior. No other complaints at this time.       REVIEW OF SYSTEMS     Constitutional:  Denies fever or chills  HENT:  Denies sore throat   Respiratory:  Denies cough or shortness of breath   Cardiovascular:  Denies chest pain or palpitations  GI:  Denies abdominal pain. Positive for nausea and vomiting.  Musculoskeletal:  Denies any new extremity pain   Skin:  Denies rash   Neurologic:  Denies headache, focal weakness or sensory changes    All other systems reviewed and are negative      PAST MEDICAL HISTORY:  History reviewed. No pertinent past medical history.    PAST SURGICAL HISTORY:  History reviewed. No pertinent surgical history.        CURRENT MEDICATIONS:    No current outpatient medications on file.      ALLERGIES:  No Known Allergies    FAMILY HISTORY:  History reviewed. No pertinent family history.    SOCIAL HISTORY:   Social History     Socioeconomic History     Marital status: Not on file     Spouse name: Not on file     Number of children: Not on file     Years of education: Not on file     Highest education level: Not on file   Occupational History     Not on file   Tobacco Use     Smoking status: Not on file   Substance and Sexual Activity     Alcohol use: Not on file     Drug use: Not on file     Sexual activity: Not on file   Other Topics Concern     Not on file   Social History Narrative     Not on file     Social Determinants of Health     Financial Resource Strain:      Difficulty of Paying Living Expenses:    Food Insecurity:      Worried About Running Out of Food in the Last Year:      Ran Out of Food in the Last Year:    Transportation Needs:      Lack of Transportation (Medical):      Lack of Transportation (Non-Medical):    Physical Activity:      Days of Exercise per Week:      Minutes of Exercise per Session:  "   Stress:      Feeling of Stress :    Social Connections:      Frequency of Communication with Friends and Family:      Frequency of Social Gatherings with Friends and Family:      Attends Worship Services:      Active Member of Clubs or Organizations:      Attends Club or Organization Meetings:      Marital Status:    Intimate Partner Violence:      Fear of Current or Ex-Partner:      Emotionally Abused:      Physically Abused:      Sexually Abused:        VITALS:  Patient Vitals for the past 24 hrs:   BP Temp Temp src Pulse Resp SpO2   07/15/21 2230 (!) 178/91 -- -- 75 -- 93 %   07/15/21 2134 -- 99.3  F (37.4  C) Oral -- 18 --   07/15/21 2130 -- -- -- 82 -- 93 %   07/15/21 2125 (!) 236/100 -- -- -- -- --       PHYSICAL EXAM    Constitutional:  Well developed, Well nourished. Nonverbal.  HENT:  Normocephalic, Atraumatic, Bilateral external ears normal, Oropharynx moist, Nose normal.   Neck:  Normal range of motion, No meningismus, No stridor.   Eyes:  EOMI, Conjunctiva normal, No discharge.   Respiratory:  Normal breath sounds, No respiratory distress, No wheezing, No chest tenderness.   Cardiovascular:  Normal heart rate, Normal rhythm, No murmurs  GI:  Moderate abdominal distension with no obvious tenderness. Soft, No guarding, No CVA tenderness.   Musculoskeletal:   Neurovascularly intact distally, No edema, No tenderness, No cyanosis, Good range of motion in all major joints. No tenderness to palpation or major deformities noted.   Integument:  Warm, Dry, No erythema, No rash.   Lymphatic:  No lymphadenopathy noted.   Neurologic:  Limited due to ability to cooperate. Alert & oriented x 3, Normal motor function, Normal sensory function, No focal deficits noted.      LAB:  All pertinent labs reviewed and interpreted.  Results for orders placed or performed during the hospital encounter of 07/15/21   CBC with platelets   Result Value Ref Range    WBC Count 14.7 (H) 4.0 - 11.0 10e3/uL    RBC Count 4.78 3.80 -  5.20 10e6/uL    Hemoglobin 13.7 11.7 - 15.7 g/dL    Hematocrit 41.1 35.0 - 47.0 %    MCV 86 78 - 100 fL    MCH 28.7 26.5 - 33.0 pg    MCHC 33.3 31.5 - 36.5 g/dL    RDW 15.0 10.0 - 15.0 %    Platelet Count 294 150 - 450 10e3/uL   Basic metabolic panel   Result Value Ref Range    Sodium 137 136 - 145 mmol/L    Potassium 3.2 (L) 3.5 - 5.0 mmol/L    Chloride 105 98 - 107 mmol/L    Carbon Dioxide (CO2) 22 22 - 31 mmol/L    Anion Gap 10 5 - 18 mmol/L    Urea Nitrogen 12 8 - 28 mg/dL    Creatinine 0.70 0.60 - 1.10 mg/dL    Calcium 9.6 8.5 - 10.5 mg/dL    Glucose 194 (H) 70 - 125 mg/dL    GFR Estimate 85 >60 mL/min/1.73m2   Hepatic function panel   Result Value Ref Range    Bilirubin Total 0.8 0.0 - 1.0 mg/dL    Bilirubin Direct 0.2 <=0.5 mg/dL    Protein Total 7.5 6.0 - 8.0 g/dL    Albumin 3.3 (L) 3.5 - 5.0 g/dL    Alkaline Phosphatase 119 45 - 120 U/L    AST 15 0 - 40 U/L    ALT 9 0 - 45 U/L   Result Value Ref Range    Lipase 18 0 - 52 U/L           I, Nevaeh Tobar, am serving as a scribe to document services personally performed by Dr. Herbert based on my observation and the provider's statements to me. I, Socrates Herbert, DO attest that Nevaeh Tobar is acting in a scribe capacity, has observed my performance of the services and has documented them in accordance with my direction.    Socrates Herbert, DO  Emergency Medicine  [facility] EMERGENCY DEPARTMENT  [address] [id number] [phone]  Dept: [phone]     Socrates Herbert MD  07/15/21 2300       Socrates Herbert MD  07/15/21 9999

## 2021-07-16 NOTE — ED PROVIDER NOTES
Progress Note    The patient was signed out to me by Dr. Herbert.    Brief HPI: Patient having several episodes of emesis at her nursing home. Patient is nonverbal at baseline. Patient is usually constipated but takes senna regularly.     Constitutional: Well developed, well nourished. Comfortable appearing.  HENT: Normocephalic, atraumatic, mucous membranes moist, nose normal. Neck- Supple, gross ROM intact.   Eyes: Pupils mid-range, sclera white, no discharge  Respiratory: No respiratory distress  Cardiovascular: Normal heart rate, regular rhythm, no murmurs.   GI: Mild distention, no focal tenderness to palpation  Musculoskeletal: Moving all 4 extremities intentionally and without pain. No obvious deformity.  Skin: Warm, dry, no rash.  Neurologic: Alert, dementia and nonverbal at baseline, moving all extremities spontaneously   Rectal: done with nurse, brown liquid stool, no hard stool or obstruction found with ALICE      ED Course as of Jul 16 0833   Thu Jul 15, 2021   2255 Sign out received. 77 y/o F from nursing home. Is nonverbal at baseline mental status. Vomited and abdomen maybe more distended. Labs with leukocytosis, mild hypokalemia. Pending UA and CT abd/pelvis. If reassuring likely plan for discharge.       2340 CT scan with: . Moderate to marked diffuse gaseous distention of the entire colon, all the way to the inferior rectum. This could either represent a colonic ileus or a distal colonic obstruction without visualized cause at the level of the inferior rectum    Will discuss with general surgery      2350 Discussed with general surgery. Will do rectal exam to see if can find rectal obstruction, if not will admit for medical management (NPO, fluids).      2358 Rectal exam done. Had loose brown stool in diaper. No masses or hard stool or other obstruction on rectal exam. Patient's sister updated on plan. Has been comfortable here. No additional vomiting.      Fri Jul 16, 2021   0043 No beds available  elsewhere. Plan to board in the ED.      0046 Discussed with hospitalist who agrees with admission here. Patient's sister is in agreement with plan. Will be signed out to Dr. Adams in AM pending admission          Final diagnoses:   Non-intractable vomiting, presence of nausea not specified, unspecified vomiting type       Hilda Akbar MD  Emergency Medicine  [facility]       Hilda Akbar MD  07/16/21 0143       Hilda Akbar MD  07/16/21 0869

## 2021-07-16 NOTE — CONSULTS
HPI  76 year old year old female who I have been consulted for evaluation of colonic distention.  Is a 76-year-old nonverbal woman who lives in a nursing home who was noted have abdominal bloating with three episodes of emesis.  She is also been a little less active according to reports.  She underwent CT imaging in the ER which demonstrated dilated colon but no obvious masses.  Currently she is laying in bed and is nonverbal but appears comfortable      Allergies:Patient has no known allergies.    History reviewed. No pertinent past medical history.    History reviewed. No pertinent surgical history.      CURRENT MEDS:    Current Facility-Administered Medications:      dextrose 5% and 0.45% NaCl 1,000 mL with potassium chloride 40 mEq/L infusion, , Intravenous, Continuous, Rober Adams MD, Last Rate: 75 mL/hr at 07/16/21 1029, New Bag at 07/16/21 1029     hydrALAZINE (APRESOLINE) injection 10 mg, 10 mg, Intravenous, Q6H PRN, Eli Sosa MD     ondansetron (ZOFRAN-ODT) ODT tab 4 mg, 4 mg, Oral, Q6H PRN **OR** ondansetron (ZOFRAN) injection 4 mg, 4 mg, Intravenous, Q6H PRN, Reinier Baron MD    Current Outpatient Medications:      aspirin (ASA) 81 MG chewable tablet, Take 81 mg by mouth daily, Disp: , Rfl:      donepezil (ARICEPT) 5 MG tablet, Take 5 mg by mouth every morning, Disp: , Rfl:      melatonin 5 MG tablet, Take 5 mg by mouth At Bedtime, Disp: , Rfl:      memantine (NAMENDA) 10 MG tablet, Take 20 mg by mouth every morning, Disp: , Rfl:      sertraline (ZOLOFT) 100 MG tablet, Take 100 mg by mouth daily, Disp: , Rfl:      acetaminophen (TYLENOL) 500 MG tablet, Take 1,000 mg by mouth every 8 hours as needed for mild pain, Disp: , Rfl:      nitroGLYcerin (NITROSTAT) 0.4 MG sublingual tablet, Place 0.4 mg under the tongue every 5 minutes as needed for chest pain For chest pain place 1 tablet under the tongue every 5 minutes for 3 doses. If symptoms persist 5 minutes after 1st dose call 911., Disp: ,  Rfl:      polyethylene glycol (MIRALAX) 17 g packet, Take 17 g by mouth daily as needed for constipation, Disp: , Rfl:     FAMHX-reviewed and noncontributory         Review of Systems:  The 12 point review of systems  is within normal limits except for as mentioned above in the HPI.  General ROS: No complaints or constitutional symptoms  Ophthalmic ROS: No complaints of visual changes  Skin: No complaints or symptoms   Endocrine: No complaints or symptoms  Hematologic/Lymphatic: No symptoms or complaints  Psychiatric: No symptoms or complaints  Respiratory ROS: no cough, shortness of breath, or wheezing  Cardiovascular ROS: no chest pain or dyspnea on exertion  Gastrointestinal ROS: As per HPI  Genito-Urinary ROS: no dysuria, trouble voiding, or hematuria  Musculoskeletal ROS: no joint or muscle pain  Neurological ROS: no TIA or stroke symptoms      EXAM:  BP (!) 153/67   Pulse 57   Temp 99.3  F (37.4  C) (Oral)   Resp 18   SpO2 97%   GENERAL: Well developed female, No acute distress, stare straight ahead nonverbal  EYES: Pupils equal, round and reactive, no scleral icterus  ABDOMEN: Distended, nontender, no evidence of focal guarding  SKIN: Pink, warm and dry, no obvious rashes or lesions   NEURO:No focal deficits, ambulatory  MUSCULOSKELETAL:No obvious deformities, no swelling, normal appearing      LABS:  Lab Results   Component Value Date    WBC 14.7 07/15/2021    HGB 13.7 07/15/2021    HCT 41.1 07/15/2021    MCV 86 07/15/2021     07/15/2021     INR/Prothrombin Time  Recent Labs   Lab 07/15/21  2154      CO2 22   BUN 12     Lab Results   Component Value Date    ALT 9 07/15/2021    AST 15 07/15/2021    ALKPHOS 119 07/15/2021       IMAGES:   Relevant images were reviewed and discussed with the patient.  Notable findings were: T scan results reviewed and demonstrate a colon is distended down to the rectum    Assessment/Plan:   Liza Camacho is a 76 year old female with colonic distention.  I  reviewed the CT images and there does appear to be a colon that is mildly distended with gas.  I cannot visualize any obvious rectal lesions precluding passage of the gas.  Her potassium is somewhat low but the distribution does not fit in Kassie's syndrome picture.  This point there is no surgery required.  We can try a rectal suppository to see if we can stimulate the rectum.  However, I would also recommend a gastroenterology evaluation for possible endoscopic evaluation.  -Case discussed with the ER physicians  -We will follow     Prieto Lozoya D.O. [facility]  [phone]

## 2021-07-17 LAB
ANION GAP SERPL CALCULATED.3IONS-SCNC: 5 MMOL/L (ref 5–18)
BUN SERPL-MCNC: 8 MG/DL (ref 8–28)
CALCIUM SERPL-MCNC: 9.4 MG/DL (ref 8.5–10.5)
CHLORIDE BLD-SCNC: 108 MMOL/L (ref 98–107)
CO2 SERPL-SCNC: 28 MMOL/L (ref 22–31)
CREAT SERPL-MCNC: 0.74 MG/DL (ref 0.6–1.1)
GFR SERPL CREATININE-BSD FRML MDRD: 80 ML/MIN/1.73M2
GLUCOSE BLD-MCNC: 126 MG/DL (ref 70–125)
HOLD SPECIMEN: NORMAL
POTASSIUM BLD-SCNC: 4.8 MMOL/L (ref 3.5–5)
SODIUM SERPL-SCNC: 141 MMOL/L (ref 136–145)

## 2021-07-17 PROCEDURE — 250N000011 HC RX IP 250 OP 636: Performed by: HOSPITALIST

## 2021-07-17 PROCEDURE — 120N000001 HC R&B MED SURG/OB

## 2021-07-17 PROCEDURE — 250N000013 HC RX MED GY IP 250 OP 250 PS 637: Performed by: INTERNAL MEDICINE

## 2021-07-17 PROCEDURE — 36415 COLL VENOUS BLD VENIPUNCTURE: CPT | Performed by: INTERNAL MEDICINE

## 2021-07-17 PROCEDURE — 80048 BASIC METABOLIC PNL TOTAL CA: CPT | Performed by: INTERNAL MEDICINE

## 2021-07-17 PROCEDURE — 99231 SBSQ HOSP IP/OBS SF/LOW 25: CPT | Performed by: SURGERY

## 2021-07-17 PROCEDURE — 99233 SBSQ HOSP IP/OBS HIGH 50: CPT | Performed by: INTERNAL MEDICINE

## 2021-07-17 PROCEDURE — 250N000011 HC RX IP 250 OP 636: Performed by: INTERNAL MEDICINE

## 2021-07-17 RX ORDER — ASPIRIN 81 MG/1
81 TABLET, CHEWABLE ORAL DAILY
Status: DISCONTINUED | OUTPATIENT
Start: 2021-07-17 | End: 2021-07-19 | Stop reason: HOSPADM

## 2021-07-17 RX ORDER — DONEPEZIL HYDROCHLORIDE 5 MG/1
5 TABLET, FILM COATED ORAL EVERY MORNING
Status: DISCONTINUED | OUTPATIENT
Start: 2021-07-17 | End: 2021-07-19 | Stop reason: HOSPADM

## 2021-07-17 RX ORDER — ACETAMINOPHEN 325 MG/1
650 TABLET ORAL EVERY 6 HOURS PRN
Status: DISCONTINUED | OUTPATIENT
Start: 2021-07-17 | End: 2021-07-19 | Stop reason: HOSPADM

## 2021-07-17 RX ORDER — MEMANTINE HYDROCHLORIDE 10 MG/1
20 TABLET ORAL EVERY MORNING
Status: DISCONTINUED | OUTPATIENT
Start: 2021-07-17 | End: 2021-07-18

## 2021-07-17 RX ORDER — POLYETHYLENE GLYCOL 3350 17 G/17G
17 POWDER, FOR SOLUTION ORAL DAILY
Status: DISCONTINUED | OUTPATIENT
Start: 2021-07-17 | End: 2021-07-19 | Stop reason: HOSPADM

## 2021-07-17 RX ADMIN — POLYETHYLENE GLYCOL 3350 17 G: 17 POWDER, FOR SOLUTION ORAL at 13:55

## 2021-07-17 RX ADMIN — DONEPEZIL HYDROCHLORIDE 5 MG: 5 TABLET, FILM COATED ORAL at 13:42

## 2021-07-17 RX ADMIN — CEFTRIAXONE SODIUM 1 G: 1 INJECTION, POWDER, FOR SOLUTION INTRAMUSCULAR; INTRAVENOUS at 18:47

## 2021-07-17 RX ADMIN — HYDRALAZINE HYDROCHLORIDE 10 MG: 20 INJECTION INTRAMUSCULAR; INTRAVENOUS at 16:05

## 2021-07-17 RX ADMIN — MEMANTINE HYDROCHLORIDE 20 MG: 10 TABLET, FILM COATED ORAL at 13:45

## 2021-07-17 RX ADMIN — ENOXAPARIN SODIUM 40 MG: 100 INJECTION SUBCUTANEOUS at 18:47

## 2021-07-17 RX ADMIN — ASPIRIN 81 MG: 81 TABLET, CHEWABLE ORAL at 13:42

## 2021-07-17 RX ADMIN — SERTRALINE HYDROCHLORIDE 100 MG: 50 TABLET ORAL at 13:42

## 2021-07-17 NOTE — PROGRESS NOTES
Care Management Follow Up Note    Length of Stay (days) 1    Patient plan of care discussed at Interdisciplinary Rounds: yes                Expected Discharge Date: 7/19/21.      Concerns to be Addressed / Barriers to Discharge: Alteration in gastrointestinal function (GI and surgery consulted): NPO, IV Rocephin, IV fluids at 75 ml/hour.     Anticipated Discharge Disposition: Return to [facility] upon discharge.   Anticipated Discharge Services:  Long term care.   Anticipated Discharge DME: Per therapy (if indicated).     Plan:  Patient has dementia and is a long term care resident of [facility]. Per RAVINDER notes, patient receives total care at baseline. The goal is for patient to return to the SNF at discharge, medical transport will likely be required.  2:54 PM:  Fax sent to [facility] to confirm bed hold status and plan for patient to return there at discharge.      Ju Fountain RN    Abbreviation  Code:  [facility] ([facility] WC), [facility] ([facility] STR), Patient (pt), Transitional Care Unit (TCU), Skilled Nursing Facility (SNF), Long Term Care (LTC), Assisted Living ([facility] or AL), Care Management (CM), Physical Therapy (PT), Occupational Therapy (OT), Speech Therapy (ST), Respiratory Therapy (RT).

## 2021-07-17 NOTE — PLAN OF CARE
Problem: Adult Inpatient Plan of Care  Goal: Plan of Care Review  Outcome: No Change     Problem: Adult Inpatient Plan of Care  Goal: Absence of Hospital-Acquired Illness or Injury  Intervention: Identify and Manage Fall Risk  Recent Flowsheet Documentation  Taken 7/16/2021 1630 by Abbie Morel, RN  Safety Promotion/Fall Prevention:    bed alarm on    fall prevention program maintained    room door open    room near nurse's station    Garbled/unintelligible speech.  Pleasant but becomes combative with cares.  Patient repositioned for comfort and to maintain skin integrity.   Purewick in place and patent.  Vital signs stable. Patient refuses to keep nasal cannular on and now refusing to have it replaced.  Will continue to monitor.    I.V.  Antibiotic Rocephin administered per order.  Patient remains NPO.  I.V. fluids infusing.

## 2021-07-17 NOTE — PROGRESS NOTES
[facility] Progress Note     IMPRESSION:  75yo who was admitted from a memory care unit for nausea and vomiting, CT showing moderate distention of colon, concerning for mild ileus. Labs also show UTI and hypokalemia.     1. Ileus  - Likely related to a combination of sedentary lifestyle and UTI. Surgery following, no surgical intervention or decompression planned at this time.   - Had incontinent stools last night x3.     2. Hypokalemia   3. UTI    RECOMMENDATIONS:  - Continue with supportive cares  - Continue to manage hypokalemia and UTI  - Glad that she was able to pass stools last night. If ongoing evidence of constipation/lack of BM's, use of rectal suppositories may be helpful.   - No plans for any endoscopic interventions at this time. May be a consideration if she decompensates.          Chandan Tobar PA-C  [facility]  [phone]  ________________________________________________________________________      SUBJECTIVE:    Demented. Unable to provide history.   Per I/O report, pt passed incontinent stool x3 last night.      OBJECTIVE:  /69 (BP Location: Right arm)   Pulse 61   Temp 98.5  F (36.9  C) (Oral)   Resp 18   Wt 70.5 kg (155 lb 7 oz)   SpO2 96%   Temp (24hrs), Av.3  F (36.8  C), Min:97.8  F (36.6  C), Max:98.7  F (37.1  C)    Patient Vitals for the past 72 hrs:   Weight   21 1341 70.5 kg (155 lb 7 oz)       Intake/Output Summary (Last 24 hours) at 2021 0818  Last data filed at 2021 0600  Gross per 24 hour   Intake 1006 ml   Output 1500 ml   Net -494 ml        PHYSICAL EXAM  GEN: Confused, communicative and in NAD.    LYMPH: No LAD noted.  HRT: RRR  LUNGS: CTA  ABD: ND, +BS, no guarding or pain to palpation, no rebound, no HSM.  SKIN: No rash, jaundice or spider angiomata      LABS:  I have reviewed the patient's new clinical lab results:     Recent Labs   Lab Test 07/15/21  2154   WBC 14.7*   HGB 13.7   MCV 86        Recent Labs   Lab  Test 07/15/21  2154   POTASSIUM 3.2*   CHLORIDE 105   CO2 22   BUN 12   ANIONGAP 10     Recent Labs   Lab Test 07/16/21  0946 07/15/21  2154   ALBUMIN  --  3.3*   BILITOTAL  --  0.8   ALT  --  9   AST  --  15   PROTEIN 30 *  --    LIPASE  --  18       IMAGING:  CT Reviewed

## 2021-07-17 NOTE — PROGRESS NOTES
[facility]    Medicine Progress Note - Hospitalist Service  Date of Service: 7/17/2021     Assessment & Plan           Liza Camacho is a 76 year old female with history of Alzheimer's dementia presents from his care center for nausea, vomiting and abdominal distention. CT scan concerns ileus.    Ileus: CT reveals moderate to marked diffuse gaseous distention of the entire colon, all the way to the inferior rectum. This could either represent a colonic ileus or a distal colonic obstruction without visualized cause at the level of the inferior rectum. Ileus likely due to constipation. Patient has now started to pass stool.  - Surgery and GI input appreciated  - Resume diet  - Miralax daily    UTI: Urine culture shows gram negative bacilli  - Continue ceftriaxone and follow up urine culture final result    Hypokalemia: potassium 3.2 on admission. Now resolved after replacement.    Alzheimer's dementia without behavior disturbance: Continue home donepezil and memantine    I called patient's sister Lydia and discussed with her about patient's condition and plan of care.       Diet: Regular Diet Adult    DVT Prophylaxis: Enoxaparin (Lovenox) SQ  Watson Catheter: Not present  Central Lines: None  Code Status: Full Code      Disposition Plan   Expected discharge: 1-2 days     The patient's care was discussed with the Bedside Nurse and Care Coordinator/.    Eli Sosa MD  Hospitalist Service  [facility]  Securely message with the Vocera Web Console (learn more here)  Text page via [facility] Paging/Directory      ______________________________________________________________________    Interval History   Per nursing staff, patient had one bowel movement last night and one bowel movement this morning with small amount of stool. When patient was seen and examined this morning, she appears comfortable. She is very confused and not able to provide any information.  Her sister reports that patient has history of chronic constipation.    Data reviewed today: I reviewed all medications, new labs and imaging results over the last 24 hours.     Physical Exam   Vital Signs: Temp: 97.7  F (36.5  C) Temp src: Oral BP: (!) 74/47 Pulse: 72   Resp: 20 SpO2: 92 % O2 Device: None (Room air)    Weight: 239 lbs 6.4 oz    General appearance: not in acute distress  HEENT: PERRL, EOMI  Lungs: Clear breath sounds in bilateral lung fields  Cardiovascular: Regular rate and rhythm, normal S1-S2  Abdomen: Soft, non tender, no distension  Musculoskeletal: No joint swelling  Skin: No rash and no edema  Neurology: Awake and alert, but confused.  Cranial nerves II - XII normal.  Normal muscle strength in all four extremities.    Data   Recent Labs   Lab 07/17/21  0838 07/15/21  2154   WBC  --  14.7*   HGB  --  13.7   MCV  --  86   PLT  --  294    137   POTASSIUM 4.8 3.2*   CHLORIDE 108* 105   CO2 28 22   BUN 8 12   CR 0.74 0.70   ANIONGAP 5 10   JAXON 9.4 9.6   * 194*   ALBUMIN  --  3.3*   PROTTOTAL  --  7.5   BILITOTAL  --  0.8   ALKPHOS  --  119   ALT  --  9   AST  --  15   LIPASE  --  18

## 2021-07-17 NOTE — PLAN OF CARE
Problem: Risk for Delirium  Goal: Improved Attention and Thought Clarity  Outcome: No Change     Problem: UTI (Urinary Tract Infection)  Goal: Improved Infection Symptoms  Outcome: No Change     Pt has severe dementia.  Repositioning as tolerated.  Purewick in place.    Vitals are stable, afebrile this shift.  IV fluids infusing.

## 2021-07-17 NOTE — PLAN OF CARE
NURSING NOTE  0700 - 1500    Problem: UTI (Urinary Tract Infection)  Goal: Improved Infection Symptoms  Outcome: Improving    D/A: Afebrile, VSS. Unable to report symptoms. Incontinent of bowel & bladder.      Pure wick in place, good UOP. Continues with IV abx. Now saline locked. Was      drowsy in the morning, awake & responsive by midday.  R: Will continue with plan of care.       - Abdomen is soft. x1 smear of stool this morning. No non-verbal signs of pain    with palpation. Tolerated pudding & mashed potatoes for lunch.  - Confused. Intelligible speech, occasionally garbled. Pleasant & calm on bed.     However is resistive, screams with cares/turning on bed. Redirectable.

## 2021-07-17 NOTE — PROGRESS NOTES
General Surgery Progress Note:    Hospital Day # 1    ASSESSMENT:   1. Non-intractable vomiting, presence of nausea not specified, unspecified vomiting type        Liza Camacho is a 76 year old female with colonic distention    PLAN:   -Continue supportive cares  -She is now stooling, so no intervention planned at this time, will continue to monitor  -Would be ok with diet if she is safe to do so  -No surgical intervention indicated, if anything, would recommend decompression with GI    Donte Baker PA-C  Pager - [phone]  Phone - [phone]   General Surgery    SUBJECTIVE:   Liza Camacho is demented. Noncommunicative     Patient Vitals for the past 24 hrs:   BP Temp Temp src Pulse Resp SpO2 Weight   07/17/21 0834 138/68 98.9  F (37.2  C) Axillary 60 17 98 % --   07/16/21 2354 135/69 98.5  F (36.9  C) Oral 61 18 96 % --   07/16/21 1930 (!) 150/68 98.3  F (36.8  C) Oral 67 18 -- --   07/16/21 1534 (!) 156/65 97.8  F (36.6  C) Oral 61 18 93 % --   07/16/21 1451 (!) 154/68 98.1  F (36.7  C) Oral 66 20 94 % --   07/16/21 1345 -- -- -- -- -- 94 % --   07/16/21 1341 (!) 173/72 98.7  F (37.1  C) Axillary 62 22 (!) 85 % 70.5 kg (155 lb 7 oz)   07/16/21 1300 (!) 147/67 -- -- 61 -- 98 % --   07/16/21 1248 -- -- -- 62 -- 96 % --   07/16/21 1200 128/59 -- -- 60 -- 97 % --   07/16/21 1130 -- -- -- 57 -- 97 % --   07/16/21 1115 -- -- -- 59 -- 99 % --   07/16/21 1100 (!) 153/67 -- -- 60 -- 97 % --   07/16/21 1045 -- -- -- 63 -- 98 % --   07/16/21 1030 -- -- -- 62 -- 98 % --   07/16/21 1015 -- -- -- 74 -- (!) 85 % --   07/16/21 1000 (!) 147/67 -- -- 60 -- 96 % --   07/16/21 0945 -- -- -- 60 -- 96 % --   07/16/21 0930 -- -- -- 61 -- 93 % --       Physical Exam:  General: NAD, resting  ABD: soft, nondistended, no obvious signs of pain with palpation   EXT:no CCE     Lab Results   Component Value Date    WBC 14.7 07/15/2021    HGB 13.7 07/15/2021    HCT 41.1 07/15/2021    MCV 86 07/15/2021     07/15/2021      INR/Prothrombin Time  Recent Labs   Lab 07/17/21  0838      CO2 28   BUN 8     Lab Results   Component Value Date    ALT 9 07/15/2021    AST 15 07/15/2021    ALKPHOS 119 07/15/2021

## 2021-07-18 PROBLEM — I10 ESSENTIAL HYPERTENSION: Status: ACTIVE | Noted: 2021-07-18

## 2021-07-18 PROBLEM — G30.9 ALZHEIMER'S DISEASE (H): Status: ACTIVE | Noted: 2021-07-18

## 2021-07-18 PROBLEM — F02.80 ALZHEIMER'S DISEASE (H): Status: ACTIVE | Noted: 2021-07-18

## 2021-07-18 PROCEDURE — 120N000001 HC R&B MED SURG/OB

## 2021-07-18 PROCEDURE — 250N000011 HC RX IP 250 OP 636: Performed by: INTERNAL MEDICINE

## 2021-07-18 PROCEDURE — 250N000011 HC RX IP 250 OP 636: Performed by: HOSPITALIST

## 2021-07-18 PROCEDURE — 250N000013 HC RX MED GY IP 250 OP 250 PS 637: Performed by: INTERNAL MEDICINE

## 2021-07-18 PROCEDURE — 99233 SBSQ HOSP IP/OBS HIGH 50: CPT | Performed by: INTERNAL MEDICINE

## 2021-07-18 RX ORDER — AMLODIPINE BESYLATE 5 MG/1
5 TABLET ORAL DAILY
Status: DISCONTINUED | OUTPATIENT
Start: 2021-07-18 | End: 2021-07-19

## 2021-07-18 RX ORDER — MEMANTINE HYDROCHLORIDE 10 MG/1
10 TABLET ORAL 2 TIMES DAILY
Status: DISCONTINUED | OUTPATIENT
Start: 2021-07-19 | End: 2021-07-19 | Stop reason: HOSPADM

## 2021-07-18 RX ADMIN — CEFTRIAXONE SODIUM 1 G: 1 INJECTION, POWDER, FOR SOLUTION INTRAMUSCULAR; INTRAVENOUS at 18:42

## 2021-07-18 RX ADMIN — AMLODIPINE BESYLATE 5 MG: 5 TABLET ORAL at 12:58

## 2021-07-18 RX ADMIN — ASPIRIN 81 MG: 81 TABLET, CHEWABLE ORAL at 09:31

## 2021-07-18 RX ADMIN — DONEPEZIL HYDROCHLORIDE 5 MG: 5 TABLET, FILM COATED ORAL at 09:31

## 2021-07-18 RX ADMIN — ENOXAPARIN SODIUM 40 MG: 100 INJECTION SUBCUTANEOUS at 18:48

## 2021-07-18 RX ADMIN — HYDRALAZINE HYDROCHLORIDE 10 MG: 20 INJECTION INTRAMUSCULAR; INTRAVENOUS at 00:31

## 2021-07-18 RX ADMIN — MEMANTINE HYDROCHLORIDE 20 MG: 10 TABLET, FILM COATED ORAL at 09:31

## 2021-07-18 RX ADMIN — SERTRALINE HYDROCHLORIDE 100 MG: 50 TABLET ORAL at 09:31

## 2021-07-18 RX ADMIN — HYDRALAZINE HYDROCHLORIDE 10 MG: 20 INJECTION INTRAMUSCULAR; INTRAVENOUS at 08:13

## 2021-07-18 NOTE — PLAN OF CARE
NURSING NOTE  0700 - 1500     Problem: UTI (Urinary Tract Infection)  Goal: Improved Infection Symptoms  Outcome: Improving    D/A: Afebrile, VSS with exception of elevated BP. Incontinent of bladder &      bowel. Unreliable reported r/t cognitive impairment. On IV abx. Skin cares      done, scheduled repositioning on bed. Encouraged food/fluid intake - feeder.  R: No acute issues. Will continue with plan of care.       No emesis. Abdomen is soft. Had x2 large incontinent stool.   Pleasantly confused. Resistive, agitated with cares only.

## 2021-07-18 NOTE — PROGRESS NOTES
[facility]    Medicine Progress Note - Hospitalist Service  Date of Service: 7/17/2021     Assessment & Plan           Liza Camacho is a 76 year old female with history of Alzheimer's dementia presents from his care center for nausea, vomiting and abdominal distention. CT scan concerns ileus.    Ileus: CT reveals moderate to marked diffuse gaseous distention of the entire colon, all the way to the inferior rectum. This could either represent a colonic ileus or a distal colonic obstruction without visualized cause at the level of the inferior rectum. Ileus likely due to constipation. Constipation now resolved.  - Surgery and GI input appreciated  - Resume diet  - Miralax daily    UTI: Urine culture shows gram negative bacilli  - Continue ceftriaxone and follow up urine culture final result    Essential hypertension: BP has been high since admission. She is not on medication at home. Start amlodipine.    Hypokalemia: potassium 3.2 on admission. Now resolved after replacement.    Alzheimer's dementia without behavior disturbance: Continue home donepezil and memantine    I called patient's sister Lydia and discussed with her about patient's condition and plan of care.       Diet: Regular Diet Adult    DVT Prophylaxis: Enoxaparin (Lovenox) SQ  Watson Catheter: Not present  Central Lines: None  Code Status: Full Code      Disposition Plan   Expected discharge: 1-2 days     The patient's care was discussed with the Bedside Nurse and Care Coordinator/.    Eli Sosa MD  Hospitalist Service  [facility]  Securely message with the [employer] Web Console (learn more here)  Text page via [employer] Paging/Directory      ______________________________________________________________________    Interval History   Per nursing staff, patient had a big bowel movement this morning. She eats well. When she was seen and examined this morning, she appeared comfortable. She is  confused and not able to provide any information. Her blood pressure has been high since admission.    Data reviewed today: I reviewed all medications, new labs and imaging results over the last 24 hours.     Physical Exam   Vital Signs: Temp: 97.7  F (36.5  C) Temp src: Oral BP: (!) 74/47 Pulse: 72   Resp: 20 SpO2: 92 % O2 Device: None (Room air)    Weight: 239 lbs 6.4 oz    General appearance: not in acute distress  HEENT: PERRL, EOMI  Lungs: Clear breath sounds in bilateral lung fields  Cardiovascular: Regular rate and rhythm, normal S1-S2  Abdomen: Soft, non tender, no distension  Musculoskeletal: No joint swelling  Skin: No rash and no edema  Neurology: Awake and alert, but confused.  Cranial nerves II - XII normal.  Normal muscle strength in all four extremities.    Data   Recent Labs   Lab 07/17/21  0838 07/15/21  2154   WBC  --  14.7*   HGB  --  13.7   MCV  --  86   PLT  --  294    137   POTASSIUM 4.8 3.2*   CHLORIDE 108* 105   CO2 28 22   BUN 8 12   CR 0.74 0.70   ANIONGAP 5 10   JAXON 9.4 9.6   * 194*   ALBUMIN  --  3.3*   PROTTOTAL  --  7.5   BILITOTAL  --  0.8   ALKPHOS  --  119   ALT  --  9   AST  --  15   LIPASE  --  18

## 2021-07-18 NOTE — PROGRESS NOTES
[facility] Progress Note     IMPRESSION:  75yo who was admitted from a memory care unit for nausea and vomiting, CT showing moderate distention of colon, concerning for mild ileus. Labs also show UTI and hypokalemia.     1. Ileus  - Likely related to a combination of sedentary lifestyle and UTI. Surgery following, no surgical intervention or decompression planned at this time.   - Had incontinent stools x4 on .     2. Hypokalemia - resolved  3. UTI    RECOMMENDATIONS:  - Continue with supportive cares  - Continue to manage UTI  - Ok to resume home Miralax to help with bowel movements  - No plans for any endoscopic interventions at this time. May be a consideration if she decompensates.      Patient appears to be stable. No plans for decompression or endoscopic exam at this time. GI will sign off, however please call if questions arise or the patient's status changes.         Chandan Tobar PA-C  [facility]  [phone]  ________________________________________________________________________      SUBJECTIVE:    Demented. Unable to provide history.   Pleasantly confused. No pains with palpation. Abdomen soft.      OBJECTIVE:  BP (!) 169/79 (BP Location: Right arm)   Pulse 66   Temp 96.8  F (36  C) (Axillary)   Resp 16   Wt 70.5 kg (155 lb 7 oz)   SpO2 97%   Temp (24hrs), Av.3  F (36.8  C), Min:97.8  F (36.6  C), Max:98.7  F (37.1  C)    Patient Vitals for the past 72 hrs:   Weight   21 1341 70.5 kg (155 lb 7 oz)       Intake/Output Summary (Last 24 hours) at 2021 0818  Last data filed at 2021 0600  Gross per 24 hour   Intake 1006 ml   Output 1500 ml   Net -494 ml        PHYSICAL EXAM  GEN: Confused, NAD.    LYMPH: No LAD noted.  HRT: RRR  LUNGS: CTA  ABD: ND, +BS, no guarding or pain to palpation, no rebound, no HSM.  SKIN: No rash, jaundice or spider angiomata      LABS:  I have reviewed the patient's new clinical lab results:     Recent Labs   Lab Test  07/15/21  2154   WBC 14.7*   HGB 13.7   MCV 86        Recent Labs   Lab Test 07/17/21  0838 07/15/21  2154   POTASSIUM 4.8 3.2*   CHLORIDE 108* 105   CO2 28 22   BUN 8 12   ANIONGAP 5 10     Recent Labs   Lab Test 07/16/21  0946 07/15/21  2154   ALBUMIN  --  3.3*   BILITOTAL  --  0.8   ALT  --  9   AST  --  15   PROTEIN 30 *  --    LIPASE  --  18       IMAGING:  CT Reviewed

## 2021-07-18 NOTE — PLAN OF CARE
Problem: Risk for Delirium  Goal: Improved Sleep  Outcome: Improving   Pt alert, disoriented x4, garbled words, high SBP of 174, hydralazine prn given, recheck was 143, pt does not show s/s of hypertension, repositioned as desire. PIV patent and saline lock. Slept  most of the night.

## 2021-07-18 NOTE — PROGRESS NOTES
Care Management Follow Up Note    Length of Stay (days) 2    Patient plan of care discussed at Interdisciplinary Rounds: yes                Expected Discharge Date: 7/19/21 or 7/20/21.      Concerns to be Addressed / Barriers to Discharge: IV Rocephin, IV Hydralazine.     Anticipated Discharge Disposition: Return to [facility] upon discharge.   Anticipated Discharge Services:  Long term care.   Anticipated Discharge DME: Per therapy (if indicated).     Plan:  Patient has dementia and is a long term care resident of [facility] where patient receives total care at baseline. The goal is for patient to return to the SNF at discharge, medical transport will likely be required.     Ju Fountain RN    Abbreviation  Code:  [facility] Wheelchair ([facility] WC), [facility] Stretcher ([facility] STR), Patient (pt), Transitional Care Unit (TCU), Skilled Nursing Facility (SNF), Long Term Care (LTC), Assisted Living ([facility] or AL), Care Management (CM), Physical Therapy (PT), Occupational Therapy (OT), Speech Therapy (ST), Respiratory Therapy (RT).

## 2021-07-19 ENCOUNTER — LAB REQUISITION (OUTPATIENT)
Dept: LAB | Facility: CLINIC | Age: 76
End: 2021-07-19
Payer: COMMERCIAL

## 2021-07-19 VITALS
SYSTOLIC BLOOD PRESSURE: 144 MMHG | WEIGHT: 155.44 LBS | RESPIRATION RATE: 20 BRPM | OXYGEN SATURATION: 98 % | DIASTOLIC BLOOD PRESSURE: 68 MMHG | TEMPERATURE: 98 F | HEART RATE: 70 BPM

## 2021-07-19 DIAGNOSIS — U07.1 COVID-19: ICD-10-CM

## 2021-07-19 PROBLEM — K56.7 ILEUS (H): Status: ACTIVE | Noted: 2021-07-19

## 2021-07-19 PROBLEM — K59.01 SLOW TRANSIT CONSTIPATION: Status: ACTIVE | Noted: 2021-07-19

## 2021-07-19 PROBLEM — N39.0 URINARY TRACT INFECTION WITHOUT HEMATURIA, SITE UNSPECIFIED: Status: ACTIVE | Noted: 2021-07-19

## 2021-07-19 LAB
BACTERIA UR CULT: ABNORMAL
BACTERIA UR CULT: ABNORMAL
HGB BLD-MCNC: 13.7 G/DL (ref 11.7–15.7)
HOLD SPECIMEN: NORMAL
PLATELET # BLD AUTO: 290 10E3/UL (ref 150–450)

## 2021-07-19 PROCEDURE — U0005 INFEC AGEN DETEC AMPLI PROBE: HCPCS | Mod: ORL | Performed by: INTERNAL MEDICINE

## 2021-07-19 PROCEDURE — 250N000013 HC RX MED GY IP 250 OP 250 PS 637: Performed by: INTERNAL MEDICINE

## 2021-07-19 PROCEDURE — 99239 HOSP IP/OBS DSCHRG MGMT >30: CPT | Performed by: INTERNAL MEDICINE

## 2021-07-19 PROCEDURE — 250N000011 HC RX IP 250 OP 636: Performed by: INTERNAL MEDICINE

## 2021-07-19 PROCEDURE — 85049 AUTOMATED PLATELET COUNT: CPT | Performed by: HOSPITALIST

## 2021-07-19 PROCEDURE — 85018 HEMOGLOBIN: CPT | Performed by: HOSPITALIST

## 2021-07-19 PROCEDURE — 36415 COLL VENOUS BLD VENIPUNCTURE: CPT | Performed by: HOSPITALIST

## 2021-07-19 RX ORDER — AMLODIPINE BESYLATE 5 MG/1
10 TABLET ORAL DAILY
Status: DISCONTINUED | OUTPATIENT
Start: 2021-07-19 | End: 2021-07-19 | Stop reason: HOSPADM

## 2021-07-19 RX ORDER — AMLODIPINE BESYLATE 5 MG/1
5 TABLET ORAL DAILY
Start: 2021-07-19 | End: 2021-07-19

## 2021-07-19 RX ORDER — POLYETHYLENE GLYCOL 3350 17 G/17G
17 POWDER, FOR SOLUTION ORAL DAILY
Start: 2021-07-19 | End: 2021-10-13

## 2021-07-19 RX ORDER — CEFDINIR 300 MG/1
300 CAPSULE ORAL 2 TIMES DAILY
Qty: 10 CAPSULE | Refills: 0
Start: 2021-07-19 | End: 2021-07-24

## 2021-07-19 RX ORDER — MEMANTINE HYDROCHLORIDE 10 MG/1
10 TABLET ORAL 2 TIMES DAILY
Start: 2021-07-19

## 2021-07-19 RX ORDER — AMLODIPINE BESYLATE 10 MG/1
10 TABLET ORAL DAILY
Start: 2021-07-19

## 2021-07-19 RX ADMIN — HYDRALAZINE HYDROCHLORIDE 10 MG: 20 INJECTION INTRAMUSCULAR; INTRAVENOUS at 00:13

## 2021-07-19 RX ADMIN — DONEPEZIL HYDROCHLORIDE 5 MG: 5 TABLET, FILM COATED ORAL at 08:31

## 2021-07-19 RX ADMIN — ASPIRIN 81 MG: 81 TABLET, CHEWABLE ORAL at 08:31

## 2021-07-19 RX ADMIN — SERTRALINE HYDROCHLORIDE 100 MG: 50 TABLET ORAL at 08:31

## 2021-07-19 RX ADMIN — POLYETHYLENE GLYCOL 3350 17 G: 17 POWDER, FOR SOLUTION ORAL at 08:32

## 2021-07-19 RX ADMIN — AMLODIPINE BESYLATE 10 MG: 5 TABLET ORAL at 08:31

## 2021-07-19 RX ADMIN — MEMANTINE HYDROCHLORIDE 10 MG: 10 TABLET, FILM COATED ORAL at 08:31

## 2021-07-19 NOTE — PLAN OF CARE
Patient discharged with discharged orders to [facility]. Picked in a wheelchair by [employer]. Accompanied with pair of sock and gave report to receiving nurse Radha.

## 2021-07-19 NOTE — DISCHARGE SUMMARY
[facility] MEDICINE  DISCHARGE SUMMARY     Primary Care Physician: Jenna Montoya MD  Admission Date: 7/15/2021   Discharge Provider: Eli Sosa MD Discharge Date: 7/19/2021   Diet:   Active Diet and Nourishment Order   Procedures     Regular Diet Adult     Advance Diet as Tolerated       Code Status: No CPR- Do NOT Intubate   Activity: DCACTIVITY: Activity as tolerated        Condition at Discharge: Stable     REASON FOR PRESENTATION(See Admission Note for Details)     Liza Camacho is a 76 year old female with history of Alzheimer's dementia presents from his care center for nausea, vomiting and abdominal distention.     PRINCIPAL & ACTIVE DISCHARGE DIAGNOSES     Principal Problem:    Non-intractable vomiting, presence of nausea not specified, unspecified vomiting type  Active Problems:    Alzheimer's disease (H)    Essential hypertension    Slow transit constipation    Ileus (H)    Urinary tract infection without hematuria, site unspecified  Hypokalemia    PENDING LABS     Unresulted Labs Ordered in the Past 30 Days of this Admission     No orders found from 6/15/2021 to 7/16/2021.            PROCEDURES ( this hospitalization only)      None    RECOMMENDATIONS TO OUTPATIENT PROVIDER FOR F/U VISIT     Follow-up Appointments     Follow Up and recommended labs and tests      Follow up with Nursing home physician.  Recheck blood pressure and adjust   medication as indicated.           DISPOSITION     Skilled Nursing Facility    SUMMARY OF HOSPITAL COURSE:      Patient presented with nausea, vomiting and abdominal distention. CT reveals moderate to marked diffuse gaseous distention of the entire colon, all the way to the inferior rectum. This could either represent a colonic ileus or a distal colonic obstruction without visualized cause at the level of the inferior rectum. GI and general surgery evaluated patient and concluded that patient's ileus is likely due to  constipation. No endoscopy is indicated. Patient had a large bowel movement after admission. She did not have nausea and vomiting. Diet was resumed and she tolerated it well. Patient was also started miralax daily. UA indicates UTI. She was started Ceftriaxone empirically. Urine culture grows E coli and Citrobacter amalonaticus. Antibiotic was changed to Cefdinir prior to discharge according to sensitivity testing. Patient also had hypokalemia on admission, which was replaced. Patient was found to have elevated blood pressure during her hospital stay. Amlodipine was started. Patient was discharged back to the nursing facility in a stable condition. Patient needs to follow up with nursing home physician for her blood pressure and adjust medication as indicated.     Discharge Medications with Med changes:     Current Discharge Medication List      START taking these medications    Details   amLODIPine (NORVASC) 10 MG tablet Take 1 tablet (10 mg) by mouth daily    Associated Diagnoses: Essential hypertension      cefdinir (OMNICEF) 300 MG capsule Take 1 capsule (300 mg) by mouth 2 times daily for 5 days  Qty: 10 capsule, Refills: 0    Associated Diagnoses: Urinary tract infection without hematuria, site unspecified         CONTINUE these medications which have CHANGED    Details   memantine (NAMENDA) 10 MG tablet Take 1 tablet (10 mg) by mouth 2 times daily    Associated Diagnoses: Alzheimer's dementia without behavioral disturbance, unspecified timing of dementia onset (H)      polyethylene glycol (MIRALAX) 17 g packet Take 17 g by mouth daily    Associated Diagnoses: Slow transit constipation         CONTINUE these medications which have NOT CHANGED    Details   aspirin (ASA) 81 MG chewable tablet Take 81 mg by mouth daily      donepezil (ARICEPT) 5 MG tablet Take 5 mg by mouth every morning      melatonin 5 MG tablet Take 5 mg by mouth At Bedtime      sertraline (ZOLOFT) 100 MG tablet Take 100 mg by mouth daily       acetaminophen (TYLENOL) 500 MG tablet Take 1,000 mg by mouth every 8 hours as needed for mild pain      nitroGLYcerin (NITROSTAT) 0.4 MG sublingual tablet Place 0.4 mg under the tongue every 5 minutes as needed for chest pain For chest pain place 1 tablet under the tongue every 5 minutes for 3 doses. If symptoms persist 5 minutes after 1st dose call 911.                   Rationale for medication changes:      Start amlodipine for hypertension.        Consults   GI and general surgery    SOCIAL WORK IP CONSULT  GASTROENTEROLOGY IP CONSULT  GASTROENTEROLOGY IP CONSULT  SURGERY GENERAL IP CONSULT  PHYSICAL THERAPY ADULT IP CONSULT  OCCUPATIONAL THERAPY ADULT IP CONSULT    Immunizations given this encounter       There is no immunization history on file for this patient.        Anticoagulation Information      Not on anticoagulation      SIGNIFICANT IMAGING FINDINGS     Results for orders placed or performed during the hospital encounter of 07/15/21   CT Abdomen Pelvis w Contrast    Impression    IMPRESSION:   1. Moderate to marked diffuse gaseous distention of the entire colon, all the way to the inferior rectum. This could either represent a colonic ileus or a distal colonic obstruction without visualized cause at the level of the inferior rectum.  2. No other acute abnormality identified in the abdomen or pelvis.       SIGNIFICANT LABORATORY FINDINGS     Most Recent 3 CBC's:Recent Labs   Lab Test 07/19/21  0655 07/15/21  2154   WBC  --  14.7*   HGB 13.7 13.7   MCV  --  86    294     Most Recent 3 BMP's:Recent Labs   Lab Test 07/17/21  0838 07/15/21  2154    137   POTASSIUM 4.8 3.2*   CHLORIDE 108* 105   CO2 28 22   BUN 8 12   CR 0.74 0.70   ANIONGAP 5 10   JAXON 9.4 9.6   * 194*         Discharge Orders        General info for SNF    Length of Stay Estimate: Long Term Care  Condition at Discharge: Improving  Level of care:skilled   Rehabilitation Potential: Fair  Admission H&P remains valid and  up-to-date: Yes  Recent Chemotherapy: N/A  Use Nursing Home Standing Orders: Yes     Reason for your hospital stay    Nausea, vomiting ad abdominal distension     Activity - Up ad leroy     Activity - Up with nursing assistance     Follow Up and recommended labs and tests    Follow up with Nursing home physician.  Recheck blood pressure and adjust medication as indicated.     No CPR- Do NOT Intubate     Physical Therapy Adult Consult    Evaluate and treat as clinically indicated.    Reason:  Generalized weakness     Occupational Therapy Adult Consult    Evaluate and treat as clinically indicated.    Reason:  Generalized weakness     Fall precautions     Advance Diet as Tolerated    Follow this diet upon discharge: Orders Placed This Encounter      Regular Diet Adult       Examination   Physical Exam   Temp:  [97.3  F (36.3  C)-98.1  F (36.7  C)] 98.1  F (36.7  C)  Pulse:  [72-81] 72  Resp:  [16-22] 20  BP: (139-195)/(67-93) 195/88  SpO2:  [97 %-99 %] 97 %  Wt Readings from Last 1 Encounters:   07/16/21 70.5 kg (155 lb 7 oz)       General appearance: not in acute distress  HEENT: PERRL, EOMI  Lungs: Clear breath sounds in bilateral lung fields  Cardiovascular: Regular rate and rhythm, normal S1-S2  Abdomen: Soft, non tender, no distension  Musculoskeletal: No joint swelling  Skin: No rash and no edema  Neurology: Awake and alert, but confused.  Cranial nerves II - XII normal.  Moves all four extremities.      Please see EMR for more detailed significant labs, imaging, consultant notes etc.    IEli MD, personally saw the patient today and spent greater than 30 minutes discharging this patient.      Eli Sosa MD  [facility]    CC:Jenna Montoya MD

## 2021-07-19 NOTE — PROGRESS NOTES
Care Management Discharge Note    Discharge Date: 07/19/2021  Expected Time of Departure: 1430 (on 7/19/21)    Discharge Disposition: Long Term Care    Discharge Services:      Discharge DME:      Discharge Transportation: other (see comments) ([facility])    Private pay costs discussed: transportation costs    PAS Confirmation Code:    Patient/family educated on Medicare website which has current facility and service quality ratings:      Education Provided on the Discharge Plan:    Persons Notified of Discharge Plans: pt and sister Lydia   Patient/Family in Agreement with the Plan: yes    Handoff Referral Completed: N/A    Additional Information: NANDA spoke with Rachel at [facility] regarding if pt had bed hold and if pt could return to facility today.  She stated that pt could return today.  NANDA spoke with pt's sister Lydia and reviewed plan for pt to discharge back to facility today.  Lydia in agreement with plan.  Lydia reported that she was unable to get pt into her vehicle.  NANDA reviewed potential costs of transportation with Lydia (although it is likely it will be zero as [facility]  Lydia okay with potential costs.  Lydia reported some concerns that pt had been taken off of her constipation medication at facility.  She wondered if pt will go back to facility with prescription for medication for constipation (she thinks pt was on senecot-dulcosate)?  NANDA will look into this.  NANDA set up ride for pt today via [facility] at 2:30 PM.  Doctor, HUC, charge, and pt's nurse all updated.  NANDA spoke with Rachel again at [facility].  NANDA let her know ride time for pt today.  NANDA also relayed pt's sister's concern about constipation medication for pt.  Rachel informed NANDA she will speak with [facility]'s clinical manager to address this with pt once she gets there.        ANUM Altamirano, ERIKA 07/19/21 5:40 PM

## 2021-07-19 NOTE — PLAN OF CARE
Problem: Adult Inpatient Plan of Care  Goal: Optimal Comfort and Wellbeing  Outcome: Improving    Patient appears comfortable.  Smiling except with cares.  Becomes resistive with cares.  Repositioned for comfort and to maintain skin integrity.    Fair appetite.  Ate approximately 50% of dinner. Feeder, no pocketing observed.  I.V. Antibiotic - Cefriaxone infused.  Now observed to be sleeping.  Purewick in place. Anticipated discharge tomorrow.

## 2021-07-19 NOTE — PLAN OF CARE
Problem: UTI (Urinary Tract Infection)  Goal: Improved Infection Symptoms  Outcome: No Change     Problem: Adult Inpatient Plan of Care  Goal: Patient-Specific Goal (Individualized)  Outcome: No Change     BP was elevated at 192/93.  PRN hydralazine given.  Recheck was 147/77  Afebrile.  Purewick in place.

## 2021-07-20 LAB — SARS-COV-2 RNA RESP QL NAA+PROBE: NEGATIVE

## 2021-07-22 ENCOUNTER — DOCUMENTATION ONLY (OUTPATIENT)
Dept: OTHER | Facility: CLINIC | Age: 76
End: 2021-07-22

## 2021-09-24 ENCOUNTER — LAB REQUISITION (OUTPATIENT)
Dept: LAB | Facility: CLINIC | Age: 76
End: 2021-09-24
Payer: OTHER MISCELLANEOUS

## 2021-09-24 DIAGNOSIS — U07.1 COVID-19: ICD-10-CM

## 2021-09-24 PROCEDURE — U0005 INFEC AGEN DETEC AMPLI PROBE: HCPCS | Mod: ORL | Performed by: INTERNAL MEDICINE

## 2021-09-26 LAB — SARS-COV-2 RNA RESP QL NAA+PROBE: NEGATIVE

## 2021-10-06 ENCOUNTER — LAB REQUISITION (OUTPATIENT)
Dept: LAB | Facility: CLINIC | Age: 76
End: 2021-10-06
Payer: OTHER MISCELLANEOUS

## 2021-10-06 DIAGNOSIS — U07.1 COVID-19: ICD-10-CM

## 2021-10-06 PROCEDURE — U0003 INFECTIOUS AGENT DETECTION BY NUCLEIC ACID (DNA OR RNA); SEVERE ACUTE RESPIRATORY SYNDROME CORONAVIRUS 2 (SARS-COV-2) (CORONAVIRUS DISEASE [COVID-19]), AMPLIFIED PROBE TECHNIQUE, MAKING USE OF HIGH THROUGHPUT TECHNOLOGIES AS DESCRIBED BY CMS-2020-01-R: HCPCS | Mod: ORL | Performed by: INTERNAL MEDICINE

## 2021-10-09 LAB — SARS-COV-2 RNA RESP QL NAA+PROBE: NEGATIVE

## 2021-10-11 ENCOUNTER — LAB REQUISITION (OUTPATIENT)
Dept: LAB | Facility: CLINIC | Age: 76
End: 2021-10-11
Payer: OTHER MISCELLANEOUS

## 2021-10-11 DIAGNOSIS — U07.1 COVID-19: ICD-10-CM

## 2021-10-12 PROCEDURE — U0003 INFECTIOUS AGENT DETECTION BY NUCLEIC ACID (DNA OR RNA); SEVERE ACUTE RESPIRATORY SYNDROME CORONAVIRUS 2 (SARS-COV-2) (CORONAVIRUS DISEASE [COVID-19]), AMPLIFIED PROBE TECHNIQUE, MAKING USE OF HIGH THROUGHPUT TECHNOLOGIES AS DESCRIBED BY CMS-2020-01-R: HCPCS | Mod: ORL | Performed by: INTERNAL MEDICINE

## 2021-10-13 ENCOUNTER — HOSPITAL ENCOUNTER (OUTPATIENT)
Facility: HOSPITAL | Age: 76
Setting detail: OBSERVATION
Discharge: NURSING, REHAB OR LONG-TERM CARE | End: 2021-10-14
Attending: EMERGENCY MEDICINE | Admitting: FAMILY MEDICINE
Payer: COMMERCIAL

## 2021-10-13 ENCOUNTER — APPOINTMENT (OUTPATIENT)
Dept: CT IMAGING | Facility: HOSPITAL | Age: 76
End: 2021-10-13
Attending: EMERGENCY MEDICINE
Payer: COMMERCIAL

## 2021-10-13 DIAGNOSIS — K59.81 OGILVIE SYNDROME: ICD-10-CM

## 2021-10-13 DIAGNOSIS — E87.6 HYPOKALEMIA: ICD-10-CM

## 2021-10-13 DIAGNOSIS — R14.0 ABDOMINAL DISTENSION: ICD-10-CM

## 2021-10-13 LAB
ALBUMIN SERPL-MCNC: 3.5 G/DL (ref 3.5–5)
ALBUMIN UR-MCNC: 30 MG/DL
ALP SERPL-CCNC: 96 U/L (ref 45–120)
ALT SERPL W P-5'-P-CCNC: 11 U/L (ref 0–45)
ANION GAP SERPL CALCULATED.3IONS-SCNC: 11 MMOL/L (ref 5–18)
APPEARANCE UR: CLEAR
APTT PPP: 30 SECONDS (ref 22–38)
AST SERPL W P-5'-P-CCNC: 16 U/L (ref 0–40)
BILIRUB DIRECT SERPL-MCNC: 0.2 MG/DL
BILIRUB SERPL-MCNC: 0.8 MG/DL (ref 0–1)
BILIRUB UR QL STRIP: NEGATIVE
BUN SERPL-MCNC: 13 MG/DL (ref 8–28)
CALCIUM SERPL-MCNC: 10.1 MG/DL (ref 8.5–10.5)
CHLORIDE BLD-SCNC: 109 MMOL/L (ref 98–107)
CO2 SERPL-SCNC: 21 MMOL/L (ref 22–31)
COLOR UR AUTO: YELLOW
CREAT SERPL-MCNC: 0.77 MG/DL (ref 0.6–1.1)
CREAT SERPL-MCNC: 0.78 MG/DL (ref 0.6–1.1)
ERYTHROCYTE [DISTWIDTH] IN BLOOD BY AUTOMATED COUNT: 14.6 % (ref 10–15)
GFR SERPL CREATININE-BSD FRML MDRD: 75 ML/MIN/1.73M2
GFR SERPL CREATININE-BSD FRML MDRD: 76 ML/MIN/1.73M2
GLUCOSE BLD-MCNC: 152 MG/DL (ref 70–125)
GLUCOSE BLDC GLUCOMTR-MCNC: 152 MG/DL (ref 70–99)
GLUCOSE UR STRIP-MCNC: NEGATIVE MG/DL
HCT VFR BLD AUTO: 42 % (ref 35–47)
HGB BLD-MCNC: 13.9 G/DL (ref 11.7–15.7)
HGB UR QL STRIP: NEGATIVE
HOLD SPECIMEN: NORMAL
INR PPP: 1.1 (ref 0.85–1.15)
KETONES UR STRIP-MCNC: NEGATIVE MG/DL
LACTATE SERPL-SCNC: 1.1 MMOL/L (ref 0.7–2)
LEUKOCYTE ESTERASE UR QL STRIP: NEGATIVE
LIPASE SERPL-CCNC: 11 U/L (ref 0–52)
MCH RBC QN AUTO: 29.4 PG (ref 26.5–33)
MCHC RBC AUTO-ENTMCNC: 33.1 G/DL (ref 31.5–36.5)
MCV RBC AUTO: 89 FL (ref 78–100)
NITRATE UR QL: NEGATIVE
PH UR STRIP: 5.5 [PH] (ref 5–7)
PLATELET # BLD AUTO: 250 10E3/UL (ref 150–450)
POTASSIUM BLD-SCNC: 3.1 MMOL/L (ref 3.5–5)
POTASSIUM BLD-SCNC: 3.1 MMOL/L (ref 3.5–5)
PROT SERPL-MCNC: 7.3 G/DL (ref 6–8)
RBC # BLD AUTO: 4.73 10E6/UL (ref 3.8–5.2)
RBC URINE: 1 /HPF
SARS-COV-2 RNA RESP QL NAA+PROBE: NEGATIVE
SARS-COV-2 RNA RESP QL NAA+PROBE: NEGATIVE
SODIUM SERPL-SCNC: 141 MMOL/L (ref 136–145)
SP GR UR STRIP: 1.03 (ref 1–1.03)
UROBILINOGEN UR STRIP-MCNC: <2 MG/DL
WBC # BLD AUTO: 12.2 10E3/UL (ref 4–11)
WBC URINE: <1 /HPF

## 2021-10-13 PROCEDURE — 82248 BILIRUBIN DIRECT: CPT | Performed by: EMERGENCY MEDICINE

## 2021-10-13 PROCEDURE — G0378 HOSPITAL OBSERVATION PER HR: HCPCS

## 2021-10-13 PROCEDURE — 83690 ASSAY OF LIPASE: CPT | Performed by: EMERGENCY MEDICINE

## 2021-10-13 PROCEDURE — 83605 ASSAY OF LACTIC ACID: CPT | Performed by: EMERGENCY MEDICINE

## 2021-10-13 PROCEDURE — 250N000013 HC RX MED GY IP 250 OP 250 PS 637

## 2021-10-13 PROCEDURE — 250N000011 HC RX IP 250 OP 636: Performed by: EMERGENCY MEDICINE

## 2021-10-13 PROCEDURE — 85730 THROMBOPLASTIN TIME PARTIAL: CPT | Performed by: EMERGENCY MEDICINE

## 2021-10-13 PROCEDURE — 250N000011 HC RX IP 250 OP 636

## 2021-10-13 PROCEDURE — C9803 HOPD COVID-19 SPEC COLLECT: HCPCS

## 2021-10-13 PROCEDURE — 250N000013 HC RX MED GY IP 250 OP 250 PS 637: Performed by: FAMILY MEDICINE

## 2021-10-13 PROCEDURE — 82310 ASSAY OF CALCIUM: CPT | Performed by: EMERGENCY MEDICINE

## 2021-10-13 PROCEDURE — 36415 COLL VENOUS BLD VENIPUNCTURE: CPT

## 2021-10-13 PROCEDURE — 99213 OFFICE O/P EST LOW 20 MIN: CPT | Performed by: SURGERY

## 2021-10-13 PROCEDURE — 80053 COMPREHEN METABOLIC PANEL: CPT | Performed by: EMERGENCY MEDICINE

## 2021-10-13 PROCEDURE — 96365 THER/PROPH/DIAG IV INF INIT: CPT | Mod: 59

## 2021-10-13 PROCEDURE — 87635 SARS-COV-2 COVID-19 AMP PRB: CPT | Performed by: EMERGENCY MEDICINE

## 2021-10-13 PROCEDURE — 36415 COLL VENOUS BLD VENIPUNCTURE: CPT | Performed by: EMERGENCY MEDICINE

## 2021-10-13 PROCEDURE — 93005 ELECTROCARDIOGRAM TRACING: CPT | Performed by: STUDENT IN AN ORGANIZED HEALTH CARE EDUCATION/TRAINING PROGRAM

## 2021-10-13 PROCEDURE — 85610 PROTHROMBIN TIME: CPT | Performed by: EMERGENCY MEDICINE

## 2021-10-13 PROCEDURE — 99285 EMERGENCY DEPT VISIT HI MDM: CPT | Mod: 25

## 2021-10-13 PROCEDURE — 96372 THER/PROPH/DIAG INJ SC/IM: CPT | Mod: 59

## 2021-10-13 PROCEDURE — 84132 ASSAY OF SERUM POTASSIUM: CPT

## 2021-10-13 PROCEDURE — 82565 ASSAY OF CREATININE: CPT | Mod: 91

## 2021-10-13 PROCEDURE — 85018 HEMOGLOBIN: CPT | Performed by: EMERGENCY MEDICINE

## 2021-10-13 PROCEDURE — 70450 CT HEAD/BRAIN W/O DYE: CPT

## 2021-10-13 PROCEDURE — 258N000003 HC RX IP 258 OP 636

## 2021-10-13 PROCEDURE — 93005 ELECTROCARDIOGRAM TRACING: CPT | Performed by: EMERGENCY MEDICINE

## 2021-10-13 PROCEDURE — 71260 CT THORAX DX C+: CPT

## 2021-10-13 PROCEDURE — 81001 URINALYSIS AUTO W/SCOPE: CPT | Performed by: EMERGENCY MEDICINE

## 2021-10-13 RX ORDER — ONDANSETRON 2 MG/ML
4 INJECTION INTRAMUSCULAR; INTRAVENOUS EVERY 6 HOURS PRN
Status: DISCONTINUED | OUTPATIENT
Start: 2021-10-13 | End: 2021-10-14 | Stop reason: HOSPADM

## 2021-10-13 RX ORDER — MORPHINE SULFATE 30 MG/1
5 TABLET ORAL
COMMUNITY

## 2021-10-13 RX ORDER — ASPIRIN 81 MG/1
81 TABLET, CHEWABLE ORAL DAILY
Status: DISCONTINUED | OUTPATIENT
Start: 2021-10-14 | End: 2021-10-14 | Stop reason: HOSPADM

## 2021-10-13 RX ORDER — PROCHLORPERAZINE 25 MG
12.5 SUPPOSITORY, RECTAL RECTAL EVERY 12 HOURS PRN
Status: DISCONTINUED | OUTPATIENT
Start: 2021-10-13 | End: 2021-10-14 | Stop reason: HOSPADM

## 2021-10-13 RX ORDER — ACETAMINOPHEN 650 MG/1
650 SUPPOSITORY RECTAL EVERY 8 HOURS PRN
COMMUNITY

## 2021-10-13 RX ORDER — RISPERIDONE 0.5 MG/1
0.5 TABLET ORAL 2 TIMES DAILY
Status: DISCONTINUED | OUTPATIENT
Start: 2021-10-13 | End: 2021-10-14 | Stop reason: HOSPADM

## 2021-10-13 RX ORDER — AMOXICILLIN 250 MG
1 CAPSULE ORAL 2 TIMES DAILY PRN
Status: DISCONTINUED | OUTPATIENT
Start: 2021-10-13 | End: 2021-10-14 | Stop reason: HOSPADM

## 2021-10-13 RX ORDER — MEMANTINE HYDROCHLORIDE 10 MG/1
10 TABLET ORAL 2 TIMES DAILY
Status: DISCONTINUED | OUTPATIENT
Start: 2021-10-13 | End: 2021-10-14 | Stop reason: HOSPADM

## 2021-10-13 RX ORDER — POTASSIUM CHLORIDE 7.45 MG/ML
10 INJECTION INTRAVENOUS ONCE
Status: COMPLETED | OUTPATIENT
Start: 2021-10-13 | End: 2021-10-13

## 2021-10-13 RX ORDER — POTASSIUM CHLORIDE 20MEQ/15ML
40 LIQUID (ML) ORAL ONCE
Status: COMPLETED | OUTPATIENT
Start: 2021-10-13 | End: 2021-10-13

## 2021-10-13 RX ORDER — SODIUM CHLORIDE 9 MG/ML
INJECTION, SOLUTION INTRAVENOUS CONTINUOUS
Status: DISCONTINUED | OUTPATIENT
Start: 2021-10-13 | End: 2021-10-14 | Stop reason: HOSPADM

## 2021-10-13 RX ORDER — PROCHLORPERAZINE MALEATE 5 MG
5 TABLET ORAL EVERY 6 HOURS PRN
Status: DISCONTINUED | OUTPATIENT
Start: 2021-10-13 | End: 2021-10-14 | Stop reason: HOSPADM

## 2021-10-13 RX ORDER — RISPERIDONE 0.5 MG/1
0.5 TABLET ORAL 2 TIMES DAILY
COMMUNITY

## 2021-10-13 RX ORDER — AMOXICILLIN 250 MG
2 CAPSULE ORAL 2 TIMES DAILY PRN
Status: DISCONTINUED | OUTPATIENT
Start: 2021-10-13 | End: 2021-10-14 | Stop reason: HOSPADM

## 2021-10-13 RX ORDER — ACETAMINOPHEN 325 MG/1
650 TABLET ORAL EVERY 6 HOURS PRN
Status: DISCONTINUED | OUTPATIENT
Start: 2021-10-13 | End: 2021-10-14 | Stop reason: HOSPADM

## 2021-10-13 RX ORDER — HALOPERIDOL 1 MG/1
1 TABLET ORAL EVERY 4 HOURS PRN
Status: DISCONTINUED | OUTPATIENT
Start: 2021-10-13 | End: 2021-10-14 | Stop reason: HOSPADM

## 2021-10-13 RX ORDER — IOPAMIDOL 755 MG/ML
100 INJECTION, SOLUTION INTRAVASCULAR ONCE
Status: COMPLETED | OUTPATIENT
Start: 2021-10-13 | End: 2021-10-13

## 2021-10-13 RX ORDER — ONDANSETRON 4 MG/1
4 TABLET, ORALLY DISINTEGRATING ORAL EVERY 6 HOURS PRN
Status: DISCONTINUED | OUTPATIENT
Start: 2021-10-13 | End: 2021-10-14 | Stop reason: HOSPADM

## 2021-10-13 RX ORDER — LIDOCAINE 40 MG/G
CREAM TOPICAL
Status: DISCONTINUED | OUTPATIENT
Start: 2021-10-13 | End: 2021-10-14 | Stop reason: HOSPADM

## 2021-10-13 RX ORDER — AMLODIPINE BESYLATE 5 MG/1
10 TABLET ORAL DAILY
Status: DISCONTINUED | OUTPATIENT
Start: 2021-10-14 | End: 2021-10-14 | Stop reason: HOSPADM

## 2021-10-13 RX ORDER — ACETAMINOPHEN 650 MG/1
650 SUPPOSITORY RECTAL EVERY 6 HOURS PRN
Status: DISCONTINUED | OUTPATIENT
Start: 2021-10-13 | End: 2021-10-14 | Stop reason: HOSPADM

## 2021-10-13 RX ADMIN — IOPAMIDOL 100 ML: 755 INJECTION, SOLUTION INTRAVENOUS at 16:30

## 2021-10-13 RX ADMIN — RISPERIDONE 0.5 MG: 0.5 TABLET ORAL at 22:18

## 2021-10-13 RX ADMIN — POTASSIUM CHLORIDE 40 MEQ: 1.5 SOLUTION ORAL at 22:21

## 2021-10-13 RX ADMIN — SODIUM CHLORIDE: 9 INJECTION, SOLUTION INTRAVENOUS at 21:12

## 2021-10-13 RX ADMIN — ENOXAPARIN SODIUM 40 MG: 40 INJECTION SUBCUTANEOUS at 22:22

## 2021-10-13 RX ADMIN — MEMANTINE HYDROCHLORIDE 10 MG: 10 TABLET, FILM COATED ORAL at 22:18

## 2021-10-13 RX ADMIN — POTASSIUM CHLORIDE 10 MEQ: 7.46 INJECTION, SOLUTION INTRAVENOUS at 16:12

## 2021-10-13 ASSESSMENT — ACTIVITIES OF DAILY LIVING (ADL): DEPENDENT_IADLS:: CLEANING;COOKING;LAUNDRY;SHOPPING;MEAL PREPARATION;MEDICATION MANAGEMENT;TRANSPORTATION;INCONTINENCE

## 2021-10-13 ASSESSMENT — MIFFLIN-ST. JEOR: SCORE: 1234.33

## 2021-10-13 NOTE — ED TRIAGE NOTES
"Pt arrives from [facility] where staff report \"abdominal distention and last BM yesterday which was loose\". Pt has h/o Alzheimer's, dementia and is nonverbal per EMS report  "

## 2021-10-13 NOTE — ED PROVIDER NOTES
EMERGENCY DEPARTMENT ENCOUNTER      NAME: Liza Camacho  AGE: 76 year old female  YOB: 1945  MRN: [id number]  EVALUATION DATE & TIME: 10/13/2021  2:14 PM    PCP: Jenna Montoya MD    ED PROVIDER: Alana Magallon M.D.        Chief Complaint   Patient presents with     abdominal distention         FINAL IMPRESSION:    1. Abdominal distension    2. Hypokalemia    3. Kassie syndrome            MEDICAL DECISION MAKIN year old female with history of severe dementia, decreased verbal communication and hypertension who presents emergency department with abdominal distention.  Found to have significant abdominal distention and colonic distention by CT scan though no signs for mechanical obstruction.  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  Patient admitted to the resident service for ongoing management.  Discussed the case with surgery who does not feel that NG tube would be of benefit at this time.  Patient's sister is at the bedside and aware of the plan and agrees.  Patient was recently on hospice but this was rescinded today as sister felt uncomfortable with making this decision quite yet but after discussion of her results and plan she seems a little more comfortable with the current plan.  She will go to the resident service.  Otherwise hemodynamically stable.          ED COURSE:  2:44 PM I met with the patient to gather history and perform my exam. ED course and treatment discussed.    5:32 PM I spoke to the radiology tech to try and see where the CT abd read is. They will work on this.     6:24 PM  CT scan results reviewed.  Patient awake and looking around the room though not able to participate in communication or conversation.  Discussed with general surgery the CT findings though she will require admission to the hospital.    6:30 PM Spoke to Dr. Paniagua with General surgery regarding the patient's case.  He does not feel an NG tube would be helpful at  this time.  He will consult on this patient.  I will get her admitted to the hospital.    6:42 PM I spoke with Dr. Ríos with the hospitalist service who agrees to admit the patient.      6:55 PM Patient's sister was in the room so I updated her.  Reports patient was actually on hospice but they rescinded the hospice today to bring her to the ER.  Sister was unable to pull the trigger to keep her at the care center today to make her comfortable with this.  She felt overwhelmed with that decision.  We had a long discussion about this.  Sister understands that she can change her mind and that this can be a very difficult decision.  I think she feels more comfortable now with understanding what is going on and that she has to write to change her mind to make the patient comfort measures if the patient worsens or she decides as the best course for this patient.  She agrees with the plan for admission.  She confirms that patient is at baseline and that in general is not communicative.  Once in a while the patient will randomly blurt out a sentence, but in general does not communicate or follow conversations.  She does not follow commands.    I do not think that this represents ACS, PE, ruptured AAA, aortic dissection, bowel ischemia, cholecystitis, pancreatitis, appendicitis, diverticulitis, kidney stone, pyelonephritis, incarcerated or strangulated hernia, ovarian torsion, PID, ectopic pregnancy, tubo-ovarian abscess, viscus perforation, perforated GI ulcer, or other such etiologies at this time.      COVID-19 PPE worn during patient evaluation:  Mask: n95 and homemade masks   Eye Protection: goggles   Gown: none  Hair cover: yes  Face shield: none  Patient wearing a mask: yes    At the conclusion of the encounter I discussed the results of all of the tests and the  disposition. Their questions were answered. The patient's family acknowledged understanding and were agreeable with the care  "plan.        CONSULTANTS:  General surgery - Dr. Paniagua          MEDICATIONS GIVEN IN THE EMERGENCY:  Medications   potassium chloride 10 mEq in 100 mL sterile water intermittent infusion (premix) (0 mEq Intravenous Stopped 10/13/21 1734)   iopamidol (ISOVUE-370) solution 100 mL (100 mLs Intravenous Given 10/13/21 1630)         NEW PRESCRIPTIONS STARTED AT TODAY'S ER VISIT     Medication List      There are no discharge medications for this visit.           CONDITION:  stable        DISPOSITION:  Med surg as accepted by [facility] Resident service         =================================================================  =================================================================    HPI    Patient information was obtained from: EMS and paper records from [facility]    Use of Intrepreter: N/A     Liza Camacho is a 76 year old female with history of Alzheimer's disease, decreased verbal communication, and HTN who presents to the ER with abdominal distension.     Patient is nonverbal. Per triage note, patient comes in from [facility] via EMS. Staff there reported \"abdominal distention and last bowel movement yesterday which was loose\". She had a Abd xray yesterday at the [facility] that suggest air filled intestines and concerns for obstruction.      REVIEW OF SYSTEMS  Review of Systems   Unable to perform ROS: Patient nonverbal         CODE STATUS:  DNR per [facility] center paperwork and POLST      PAST MEDICAL HISTORY:  History reviewed. No pertinent past medical history.      PAST SURGICAL HISTORY:  History reviewed. No pertinent surgical history.      CURRENT MEDICATIONS:    Prior to Admission medications    Medication Sig Start Date End Date Taking? Authorizing Provider   acetaminophen (TYLENOL) 500 MG tablet Take 1,000 mg by mouth every 8 hours as needed for mild pain    Unknown, Entered By History   amLODIPine (NORVASC) 10 MG tablet Take 1 tablet (10 mg) by mouth daily 7/19/21   Ian, " MD Eli   aspirin (ASA) 81 MG chewable tablet Take 81 mg by mouth daily    Unknown, Entered By History   donepezil (ARICEPT) 5 MG tablet Take 5 mg by mouth every morning    Unknown, Entered By History   melatonin 5 MG tablet Take 5 mg by mouth At Bedtime    Unknown, Entered By History   memantine (NAMENDA) 10 MG tablet Take 1 tablet (10 mg) by mouth 2 times daily 7/19/21   Eli Sosa MD   nitroGLYcerin (NITROSTAT) 0.4 MG sublingual tablet Place 0.4 mg under the tongue every 5 minutes as needed for chest pain For chest pain place 1 tablet under the tongue every 5 minutes for 3 doses. If symptoms persist 5 minutes after 1st dose call 911.    Unknown, Entered By History   polyethylene glycol (MIRALAX) 17 g packet Take 17 g by mouth daily 7/19/21   Eli Sosa MD   sertraline (ZOLOFT) 100 MG tablet Take 100 mg by mouth daily    Unknown, Entered By History         ALLERGIES:  No Known Allergies      FAMILY HISTORY:  History reviewed. No pertinent family history.      SOCIAL HISTORY:  Social History     Socioeconomic History     Marital status: Single     Spouse name: Not on file     Number of children: Not on file     Years of education: Not on file     Highest education level: Not on file   Occupational History     Not on file   Tobacco Use     Smoking status: Not on file   Substance and Sexual Activity     Alcohol use: Not on file     Drug use: Not on file     Sexual activity: Not on file   Other Topics Concern     Not on file   Social History Narrative     Not on file     Social Determinants of Health     Financial Resource Strain:      Difficulty of Paying Living Expenses:    Food Insecurity:      Worried About Running Out of Food in the Last Year:      Ran Out of Food in the Last Year:    Transportation Needs:      Lack of Transportation (Medical):      Lack of Transportation (Non-Medical):    Physical Activity:      Days of Exercise per Week:      Minutes of Exercise per Session:    Stress:      Feeling  of Stress :    Social Connections:      Frequency of Communication with Friends and Family:      Frequency of Social Gatherings with Friends and Family:      Attends Temple Services:      Active Member of Clubs or Organizations:      Attends Club or Organization Meetings:      Marital Status:    Intimate Partner Violence:      Fear of Current or Ex-Partner:      Emotionally Abused:      Physically Abused:      Sexually Abused:          VITALS:  Patient Vitals for the past 24 hrs:   BP Temp Temp src Pulse Resp SpO2   10/13/21 1600 (!) 159/77 -- -- 74 17 97 %   10/13/21 1530 (!) 152/83 -- -- 81 18 95 %   10/13/21 1500 (!) 151/86 -- -- 69 19 97 %   10/13/21 1424 (!) 165/85 99.2  F (37.3  C) Oral 75 16 97 %       Wt Readings from Last 3 Encounters:   07/16/21 70.5 kg (155 lb 7 oz)         PHYSICAL EXAM    Constitutional:  Well developed, Well nourished, NAD  HENT:  Normocephalic, Atraumatic, Bilateral external ears normal, Nose normal. Neck-  Normal range of motion, No tenderness, Supple, No stridor.   Eyes:  PERRL, EOMI, Conjunctiva normal, No discharge.  Respiratory:  Normal breath sounds, No respiratory distress, No wheezing, No cough.   Cardiovascular:  Normal heart rate, Regular rhythm,No murmurs, No rubs, No gallops. Chest wall nontender.   GI:  No excessive obesity.  Bowel sounds decreased, +abdominal distension without obvious tenderness.  : deferred  Musculoskeletal: 2+ DP pulses. No cyanosis, No clubbing. No major deformities noted.   Integument:  Warm, Dry, No erythema, No rash.  No petechiae.   Neurologic: Keeps eyes closed, does not follow commands but does squeeze hands when stimulated, CANNON spontaneously  Psychiatric:  Well kept         LAB:  All pertinent labs reviewed and interpreted.  Recent Results (from the past 24 hour(s))   Glucose by meter    Collection Time: 10/13/21  3:00 PM   Result Value Ref Range    GLUCOSE BY METER POCT 152 (H) 70 - 99 mg/dL   INR    Collection Time: 10/13/21  3:16 PM    Result Value Ref Range    INR 1.10 0.85 - 1.15   PTT    Collection Time: 10/13/21  3:16 PM   Result Value Ref Range    aPTT 30 22 - 38 Seconds   Asymptomatic COVID-19 Virus (Coronavirus) by PCR Nasopharyngeal    Collection Time: 10/13/21  3:16 PM    Specimen: Nasopharyngeal; Swab   Result Value Ref Range    SARS CoV2 PCR Negative Negative   Extra Blue Top Tube    Collection Time: 10/13/21  3:16 PM   Result Value Ref Range    Hold Specimen JIC    CBC (+ platelets, no diff)    Collection Time: 10/13/21  3:17 PM   Result Value Ref Range    WBC Count 12.2 (H) 4.0 - 11.0 10e3/uL    RBC Count 4.73 3.80 - 5.20 10e6/uL    Hemoglobin 13.9 11.7 - 15.7 g/dL    Hematocrit 42.0 35.0 - 47.0 %    MCV 89 78 - 100 fL    MCH 29.4 26.5 - 33.0 pg    MCHC 33.1 31.5 - 36.5 g/dL    RDW 14.6 10.0 - 15.0 %    Platelet Count 250 150 - 450 10e3/uL   Basic metabolic panel    Collection Time: 10/13/21  3:17 PM   Result Value Ref Range    Sodium 141 136 - 145 mmol/L    Potassium 3.1 (L) 3.5 - 5.0 mmol/L    Chloride 109 (H) 98 - 107 mmol/L    Carbon Dioxide (CO2) 21 (L) 22 - 31 mmol/L    Anion Gap 11 5 - 18 mmol/L    Urea Nitrogen 13 8 - 28 mg/dL    Creatinine 0.78 0.60 - 1.10 mg/dL    Calcium 10.1 8.5 - 10.5 mg/dL    Glucose 152 (H) 70 - 125 mg/dL    GFR Estimate 75 >60 mL/min/1.73m2   Hepatic function panel    Collection Time: 10/13/21  3:17 PM   Result Value Ref Range    Bilirubin Total 0.8 0.0 - 1.0 mg/dL    Bilirubin Direct 0.2 <=0.5 mg/dL    Protein Total 7.3 6.0 - 8.0 g/dL    Albumin 3.5 3.5 - 5.0 g/dL    Alkaline Phosphatase 96 45 - 120 U/L    AST 16 0 - 40 U/L    ALT 11 0 - 45 U/L   Lipase    Collection Time: 10/13/21  3:17 PM   Result Value Ref Range    Lipase 11 0 - 52 U/L   Lactic acid whole blood    Collection Time: 10/13/21  3:17 PM   Result Value Ref Range    Lactic Acid 1.1 0.7 - 2.0 mmol/L   Extra Red Top Tube    Collection Time: 10/13/21  3:17 PM   Result Value Ref Range    Hold Specimen JIC    Extra Green Top (Lithium  Heparin) Tube    Collection Time: 10/13/21  3:17 PM   Result Value Ref Range    Hold Specimen JIC    Extra Purple Top Tube    Collection Time: 10/13/21  3:17 PM   Result Value Ref Range    Hold Specimen JIC    UA with Microscopic reflex to Culture    Collection Time: 10/13/21  3:52 PM    Specimen: Urine, Clean Catch   Result Value Ref Range    Color Urine Yellow Colorless, Straw, Light Yellow, Yellow    Appearance Urine Clear Clear    Glucose Urine Negative Negative mg/dL    Bilirubin Urine Negative Negative    Ketones Urine Negative Negative mg/dL    Specific Gravity Urine 1.031 (H) 1.001 - 1.030    Blood Urine Negative Negative    pH Urine 5.5 5.0 - 7.0    Protein Albumin Urine 30  (A) Negative mg/dL    Urobilinogen Urine <2.0 <2.0 mg/dL    Nitrite Urine Negative Negative    Leukocyte Esterase Urine Negative Negative    RBC Urine 1 <=2 /HPF    WBC Urine <1 <=5 /HPF       No results found for: [facility]        RADIOLOGY:  Reviewed all pertinent imaging. Please see official radiology report.    CT Chest/Abdomen/Pelvis w Contrast   Final Result   IMPRESSION:   1.  Increasing diffuse colonic distention, consistent with [place] syndrome.   2.  Gastroesophageal reflux with fluid distended esophagus. The patient is at risk for aspiration.   3.  Left thyroid 1.2 cm nodule. No further imaging per guidelines below.      REFERENCE:   Alfred ZARAGOZAK et al. Managing Incidental Thyroid Nodules Detected on Imaging: White Paper of the ACR Incidental Thyroid Findings Committee. JACR 2015; 12:143-150.      Incidental thyroid nodule detected on CT or MRI without suspicious findings. Applies to general population without limited life expectancy or significant comorbidities.      Age greater than or equal to 35 years   Less than 1.5 cm: No further evaluation.   Greater than or equal to 1.5 cm: Evaluate with thyroid ultrasound.      Head CT w/o contrast   Final Result   IMPRESSION:   1.  No acute intracranial abnormality.      2.  Severe burden  chronic small vessel ischemic change with a small amount of encephalomalacia in the posterior right temporal lobe. This is accompanied by a moderate diffuse parenchymal volume loss.      3.  Calcified extra-axial mass measuring up to 2.2 cm in diameter in the anterolateral right middle cranial fossa likely reflects a densely calcified meningioma.            EKG:    Performed at: 14:22p  Impression: Sinus rhythm at 79 bpm.  Slight flattened T waves.  ME interval 180 ms, QRS 72 ms,  ms.  No prior EKGs to compare to.    Performed at: 17:04p  Impression: Sinus rhythm at 82 bpm.  Overall diffusely flattened T waves.  There is some motion artifact.  ME interval 164 ms, QRS 74 ms,  ms though I suspect this QTC is likely due to movement artifact.  Overall appears similar to one from earlier today.    Performed at: 17:44p  Impression: Sinus rhythm at 79 bpm overall diffusely flattened T waves.  ME interval 174 ms, QRS 76 ms,  ms.  Overall EKG does appear similar to the prior 2 EKGs.    I have independently reviewed and interpreted the EKG(s) documented above.        PROCEDURES:  none        I, Keya Mir, am serving as a scribe to document services personally performed by Dr. Alana Magallon based on my observation and the provider's statements to me. I, Dr. Alana Magallon MD attest that Keya Mir is acting in a scribe capacity, has observed my performance of the services and has documented them in accordance with my direction.        Alana Magallon M.D. [facility]  Emergency Medicine and Medical Toxicology  [facility] EMERGENCY DEPARTMENT  [facility] [place] [address] [address]  [phone]  Dept: [phone]           Alana Magallon MD  10/13/21 1923

## 2021-10-13 NOTE — ED NOTES
Bed: JNED-22  Expected date: 10/13/21  Expected time:   Means of arrival: Ambulance  Comments:  WILLIE  76 F  Abd pain

## 2021-10-13 NOTE — PHARMACY-ADMISSION MEDICATION HISTORY
Pharmacy Note - Admission Medication History    Pertinent Provider Information: None     ______________________________________________________________________    Prior To Admission (PTA) med list completed and updated in EMR.       PTA Med List   Medication Sig Last Dose     acetaminophen (TYLENOL) 500 MG tablet Take 1,000 mg by mouth every 8 hours as needed for mild pain      acetaminophen (TYLENOL) 650 MG suppository Place 650 mg rectally every 8 hours as needed (temp greater than 101)      amLODIPine (NORVASC) 10 MG tablet Take 1 tablet (10 mg) by mouth daily 10/13/2021 at Unknown time     aspirin (ASA) 81 MG chewable tablet Take 81 mg by mouth daily 10/13/2021 at Unknown time     haloperidol (HALDOL) 0.5 mg solutab Place 1 mg under the tongue every 4 hours as needed for nausea (delirium)      memantine (NAMENDA) 10 MG tablet Take 1 tablet (10 mg) by mouth 2 times daily 10/13/2021 at am     miconazole (MICATIN) 2 % AERP powder Apply 1 applicator topically 2 times daily as needed (yeasty rash under breast & abdominal folds)      morphine 5 MG solu-tab Place 5 mg under the tongue every hour as needed for shortness of breath / dyspnea or pain      nitroGLYcerin (NITROSTAT) 0.4 MG sublingual tablet Place 0.4 mg under the tongue every 5 minutes as needed for chest pain For chest pain place 1 tablet under the tongue every 5 minutes for 3 doses. If symptoms persist 5 minutes after 1st dose call 911.      risperiDONE (RISPERDAL) 0.5 MG tablet Take 0.5 mg by mouth 2 times daily 10/13/2021 at am     sertraline (ZOLOFT) 100 MG tablet Take 100 mg by mouth daily 10/13/2021 at Unknown time     UNABLE TO FIND 0.4 mg by Orally disintegrating tablet route every 8 hours as needed (increased congestion) MEDICATION NAME: Atropine 4 mg solutab        Information source(s): Facility ([facility] medication list/MAR  Method of interview communication: N/A    Summary of Changes to PTA Med List  New: atropine solutab, haloperidal  solutab, morphine solutab, risperdal  Discontinued: donepezil  Changed:      Patient was asked about OTC/herbal products specifically.  PTA med list reflects this.    In the past week, patient estimated taking medication this percent of the time:  greater than 90%.    Allergies were reviewed, assessed, and updated with the patient.      Patient does not use any multi-dose medications prior to admission.    The information provided in this note is only as accurate as the sources available at the time of the update(s).    Thank you for the opportunity to participate in the care of this patient.    Ruy Nixon RPH  10/13/2021 6:51 PM

## 2021-10-14 VITALS
WEIGHT: 159.3 LBS | SYSTOLIC BLOOD PRESSURE: 120 MMHG | BODY MASS INDEX: 25.6 KG/M2 | HEIGHT: 66 IN | DIASTOLIC BLOOD PRESSURE: 59 MMHG | HEART RATE: 59 BPM | TEMPERATURE: 98.9 F | OXYGEN SATURATION: 94 % | RESPIRATION RATE: 22 BRPM

## 2021-10-14 LAB
ANION GAP SERPL CALCULATED.3IONS-SCNC: 8 MMOL/L (ref 5–18)
ATRIAL RATE - MUSE: 79 BPM
ATRIAL RATE - MUSE: 79 BPM
ATRIAL RATE - MUSE: 82 BPM
BUN SERPL-MCNC: 11 MG/DL (ref 8–28)
CALCIUM SERPL-MCNC: 9 MG/DL (ref 8.5–10.5)
CHLORIDE BLD-SCNC: 113 MMOL/L (ref 98–107)
CO2 SERPL-SCNC: 21 MMOL/L (ref 22–31)
CREAT SERPL-MCNC: 0.69 MG/DL (ref 0.6–1.1)
DIASTOLIC BLOOD PRESSURE - MUSE: 85 MMHG
DIASTOLIC BLOOD PRESSURE - MUSE: 88 MMHG
DIASTOLIC BLOOD PRESSURE - MUSE: 88 MMHG
ERYTHROCYTE [DISTWIDTH] IN BLOOD BY AUTOMATED COUNT: 14.7 % (ref 10–15)
GFR SERPL CREATININE-BSD FRML MDRD: 85 ML/MIN/1.73M2
GLUCOSE BLD-MCNC: 118 MG/DL (ref 70–125)
HCT VFR BLD AUTO: 33.9 % (ref 35–47)
HGB BLD-MCNC: 11.3 G/DL (ref 11.7–15.7)
INTERPRETATION ECG - MUSE: NORMAL
MCH RBC QN AUTO: 29.9 PG (ref 26.5–33)
MCHC RBC AUTO-ENTMCNC: 33.3 G/DL (ref 31.5–36.5)
MCV RBC AUTO: 90 FL (ref 78–100)
P AXIS - MUSE: 60 DEGREES
P AXIS - MUSE: 69 DEGREES
P AXIS - MUSE: 81 DEGREES
PLATELET # BLD AUTO: 219 10E3/UL (ref 150–450)
POTASSIUM BLD-SCNC: 2.9 MMOL/L (ref 3.5–5)
POTASSIUM BLD-SCNC: 3.3 MMOL/L (ref 3.5–5)
PR INTERVAL - MUSE: 164 MS
PR INTERVAL - MUSE: 174 MS
PR INTERVAL - MUSE: 180 MS
QRS DURATION - MUSE: 72 MS
QRS DURATION - MUSE: 74 MS
QRS DURATION - MUSE: 76 MS
QT - MUSE: 350 MS
QT - MUSE: 350 MS
QT - MUSE: 572 MS
QTC - MUSE: 401 MS
QTC - MUSE: 401 MS
QTC - MUSE: 668 MS
R AXIS - MUSE: 36 DEGREES
R AXIS - MUSE: 58 DEGREES
R AXIS - MUSE: 58 DEGREES
RBC # BLD AUTO: 3.78 10E6/UL (ref 3.8–5.2)
SODIUM SERPL-SCNC: 142 MMOL/L (ref 136–145)
SYSTOLIC BLOOD PRESSURE - MUSE: 165 MMHG
SYSTOLIC BLOOD PRESSURE - MUSE: 177 MMHG
SYSTOLIC BLOOD PRESSURE - MUSE: 177 MMHG
T AXIS - MUSE: 110 DEGREES
T AXIS - MUSE: 115 DEGREES
T AXIS - MUSE: 92 DEGREES
TSH SERPL DL<=0.005 MIU/L-ACNC: 1.36 UIU/ML (ref 0.3–5)
VENTRICULAR RATE- MUSE: 79 BPM
VENTRICULAR RATE- MUSE: 79 BPM
VENTRICULAR RATE- MUSE: 82 BPM
WBC # BLD AUTO: 10.2 10E3/UL (ref 4–11)

## 2021-10-14 PROCEDURE — 99219 PR INITIAL OBSERVATION CARE,LEVEL II: CPT | Mod: GC

## 2021-10-14 PROCEDURE — 96376 TX/PRO/DX INJ SAME DRUG ADON: CPT

## 2021-10-14 PROCEDURE — 84132 ASSAY OF SERUM POTASSIUM: CPT | Mod: 91

## 2021-10-14 PROCEDURE — 258N000003 HC RX IP 258 OP 636

## 2021-10-14 PROCEDURE — G0378 HOSPITAL OBSERVATION PER HR: HCPCS

## 2021-10-14 PROCEDURE — 250N000013 HC RX MED GY IP 250 OP 250 PS 637

## 2021-10-14 PROCEDURE — 80048 BASIC METABOLIC PNL TOTAL CA: CPT

## 2021-10-14 PROCEDURE — 85041 AUTOMATED RBC COUNT: CPT

## 2021-10-14 PROCEDURE — 99213 OFFICE O/P EST LOW 20 MIN: CPT | Performed by: PHYSICIAN ASSISTANT

## 2021-10-14 PROCEDURE — 36415 COLL VENOUS BLD VENIPUNCTURE: CPT

## 2021-10-14 PROCEDURE — 84443 ASSAY THYROID STIM HORMONE: CPT | Performed by: INTERNAL MEDICINE

## 2021-10-14 PROCEDURE — 250N000011 HC RX IP 250 OP 636: Performed by: STUDENT IN AN ORGANIZED HEALTH CARE EDUCATION/TRAINING PROGRAM

## 2021-10-14 RX ORDER — POTASSIUM CHLORIDE 7.45 MG/ML
10 INJECTION INTRAVENOUS
Status: COMPLETED | OUTPATIENT
Start: 2021-10-14 | End: 2021-10-14

## 2021-10-14 RX ADMIN — POTASSIUM CHLORIDE 10 MEQ: 7.46 INJECTION, SOLUTION INTRAVENOUS at 12:08

## 2021-10-14 RX ADMIN — ASPIRIN 81 MG CHEWABLE TABLET 81 MG: 81 TABLET CHEWABLE at 12:26

## 2021-10-14 RX ADMIN — SERTRALINE HYDROCHLORIDE 100 MG: 50 TABLET ORAL at 12:28

## 2021-10-14 RX ADMIN — RISPERIDONE 0.5 MG: 0.5 TABLET ORAL at 12:27

## 2021-10-14 RX ADMIN — AMLODIPINE BESYLATE 10 MG: 5 TABLET ORAL at 12:25

## 2021-10-14 RX ADMIN — POTASSIUM CHLORIDE 10 MEQ: 7.46 INJECTION, SOLUTION INTRAVENOUS at 10:56

## 2021-10-14 RX ADMIN — POTASSIUM CHLORIDE 10 MEQ: 7.46 INJECTION, SOLUTION INTRAVENOUS at 06:53

## 2021-10-14 RX ADMIN — MEMANTINE HYDROCHLORIDE 10 MG: 10 TABLET, FILM COATED ORAL at 12:27

## 2021-10-14 RX ADMIN — POTASSIUM CHLORIDE 10 MEQ: 7.46 INJECTION, SOLUTION INTRAVENOUS at 04:29

## 2021-10-14 RX ADMIN — POTASSIUM CHLORIDE 10 MEQ: 7.46 INJECTION, SOLUTION INTRAVENOUS at 08:31

## 2021-10-14 RX ADMIN — POTASSIUM CHLORIDE 10 MEQ: 7.46 INJECTION, SOLUTION INTRAVENOUS at 05:54

## 2021-10-14 RX ADMIN — SODIUM CHLORIDE: 9 INJECTION, SOLUTION INTRAVENOUS at 06:55

## 2021-10-14 NOTE — CONSULTS
"Care Management Initial Consult    General Information  Assessment completed with: Family, Lydia sister  Type of CM/SW Visit: Initial Assessment    Primary Care Provider verified and updated as needed: Yes   Readmission within the last 30 days: no previous admission in last 30 days      Reason for Consult: discharge planning  Advance Care Planning:            Communication Assessment  Patient's communication style: spoken language (English or Bilingual) (\"nonverbal at baseline\")    Hearing Difficulty or Deaf: no   Wear Glasses or Blind: no    Cognitive  Cognitive/Neuro/Behavioral:  (dementia at [facility])                      Living Environment:   People in home: facility resident     Current living Arrangements: extended care facility  Name of Facility: [facility]   Able to return to prior arrangements: yes (may need hospice involvement)       Family/Social Support:  Care provided by: other (see comments) (LTC)  Provides care for: no one, unable/limited ability to care for self     Sibling(s), Other (specify) (niece)          Description of Support System: Supportive, Involved    Support Assessment: Adequate family and caregiver support, Adequate social supports, Patient communicates needs well met    Current Resources:   Patient receiving home care services: No     Community Resources: Skilled Nursing Facility ([facility])  Equipment currently used at home: hospital bed, lift device (\"mamie lift\")  Supplies currently used at home: Incontinence Supplies    Employment/Financial:  Employment Status: retired        Financial Concerns:     Referral to Financial Counselor: No       Lifestyle & Psychosocial Needs:  Social Determinants of Health     Tobacco Use:      Smoking Tobacco Use:      Smokeless Tobacco Use:    Alcohol Use:      Frequency of Alcohol Consumption:      Average Number of Drinks:      Frequency of Binge Drinking:    Financial Resource Strain:      Difficulty of Paying Living " Expenses:    Food Insecurity:      Worried About Running Out of Food in the Last Year:      Ran Out of Food in the Last Year:    Transportation Needs:      Lack of Transportation (Medical):      Lack of Transportation (Non-Medical):    Physical Activity:      Days of Exercise per Week:      Minutes of Exercise per Session:    Stress:      Feeling of Stress :    Social Connections:      Frequency of Communication with Friends and Family:      Frequency of Social Gatherings with Friends and Family:      Attends [employer] Services:      Active Member of Clubs or Organizations:      Attends Club or Organization Meetings:      Marital Status:    Intimate Partner Violence:      Fear of Current or Ex-Partner:      Emotionally Abused:      Physically Abused:      Sexually Abused:    Depression:      PHQ-2 Score:    Housing Stability:      Unable to Pay for Housing in the Last Year:      Number of Places Lived in the Last Year:      Unstable Housing in the Last Year:        Functional Status:  Prior to admission patient needed assistance:   Dependent ADLs:: Bathing, Dressing, Eating, Grooming, Incontinence, Transfers, Toileting, Wheelchair-with assist  Dependent IADLs:: Cleaning, Cooking, Laundry, Shopping, Meal Preparation, Medication Management, Transportation, Incontinence       Mental Health Status:          Chemical Dependency Status:                Values/Beliefs:  Spiritual, Cultural Beliefs, [employer] Practices, Values that affect care:                 Additional Information:  Liza lives at [facility]. She is total cares at baseline. She has dementia and is non-verbal. They use a mamie lift for her and she is normally either in the hospital bed or a reclining teddy chair.    Family may be interested in Hospice services and [facility].    [facility] transport at discharge.    Luisa Gonzalez RN

## 2021-10-14 NOTE — PROGRESS NOTES
Care Management Discharge Note    Discharge Date: 10/14/2021  Expected Time of Departure: 330Pm    Discharge Disposition: Skilled Nursing Facilty, Long Term Care    Discharge Services:  Likely resume hospice services with [facility]     Discharge DME: Wheelchair, Bed    Discharge Transportation: health plan transportation    Private pay costs discussed: insurance costs out of pocket expenses    PAS Confirmation Code:  N/A  Patient/family educated on Medicare website which has current facility and service quality ratings:      Education Provided on the Discharge Plan:  yes  Persons Notified of Discharge Plans: Pts sister Lydia  Patient/Family in Agreement with the Plan: yes    Handoff Referral Completed: Yes - spoke to Tabitha in admissions at [facility]    Additional Information:  NANDA completed chart review, discussed updates with MD. Pt stable for discharge and return to long term care at [facility]. Per admissions department, they are able to accept patient back today, requesting SNF orders. NANDA left message for [facility] [phone].   NANDA spoke to pt's sister Lydia to provide update, she will be coming to hospital shortly to meet with MD and states understanding of discharge plan.  [facility] Stretcher ride scheduled for 3:30pm- PCS done.       Herlinda Khan, ERIKA

## 2021-10-14 NOTE — PLAN OF CARE
"PRIMARY DIAGNOSIS: \"GENERIC\" NURSING  OUTPATIENT/OBSERVATION GOALS TO BE MET BEFORE DISCHARGE:  ADLs back to baseline:  total care    Activity and level of assistance: Up with maximum assistance. Consider SW and/or PT evaluation.     Pain status: NENA    Return to near baseline physical activity:  N/A     Discharge Planner Nurse   Safe discharge environment identified: Yes  Barriers to discharge: Yes       Entered by: Tessa Posey 10/14/2021 4:46 AM   K=2.9,receiving IV potassium chloride,pt has been drowsy this shift.   Please review provider order for any additional goals.   Nurse to notify provider when observation goals have been met and patient is ready for discharge.  "

## 2021-10-14 NOTE — PLAN OF CARE
"PRIMARY DIAGNOSIS: \"GENERIC\" NURSING  OUTPATIENT/OBSERVATION GOALS TO BE MET BEFORE DISCHARGE:  ADLs back to baseline:  N/A total care    Activity and level of assistance: Up with maximum assistance. Consider SW and/or PT evaluation.     Pain status: NENA    Return to near baseline physical activity:  N/A     Discharge Planner Nurse   Safe discharge environment identified: Yes  Barriers to discharge: No         Entered by: Tessa Posey 10/14/2021 1:04 AM     Please review provider order for any additional goals.   Nurse to notify provider when observation goals have been met and patient is ready for discharge.  "

## 2021-10-14 NOTE — ED NOTES
Pt is ready to go to floor. Sister at bedside and aware that pt is going to room 426. What is observation? Per Luisa (Care Coordinator). Pt has been wheelchair bound and eating puree diet recently per sister.

## 2021-10-14 NOTE — PROGRESS NOTES
"General Surgery Progress Note:    Hospital Day # 0    ASSESSMENT:   1. Abdominal distension    2. Hypokalemia    3. 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    PLAN:   -GI has been consulted, appreciate their recs  -Continue rectal tube for now  -No surgical indication at this time    Donte Baker PA-C  Pager - [phone]  Phone - [phone]   General Surgery    SUBJECTIVE:   Liza Camacho is resting, rectal tube has been placed    Patient Vitals for the past 24 hrs:   BP Temp Temp src Pulse Resp SpO2 Height Weight   10/14/21 0710 (!) 195/85 97.9  F (36.6  C) Axillary 65 18 95 % -- --   10/14/21 0345 (!) 142/70 97.7  F (36.5  C) Oral 58 16 96 % -- --   10/13/21 2338 111/61 98.2  F (36.8  C) Axillary 69 16 97 % -- --   10/13/21 2003 (!) 181/85 98.7  F (37.1  C) Axillary 88 18 91 % 1.676 m (5' 6\") 72.3 kg (159 lb 4.8 oz)   10/13/21 1930 (!) 172/95 -- -- 87 21 97 % -- --   10/13/21 1915 (!) 188/88 -- -- 82 18 98 % -- --   10/13/21 1900 (!) 171/78 -- -- 81 21 98 % -- --   10/13/21 1600 (!) 159/77 -- -- 74 17 97 % -- --   10/13/21 1530 (!) 152/83 -- -- 81 18 95 % -- --   10/13/21 1500 (!) 151/86 -- -- 69 19 97 % -- --   10/13/21 1424 (!) 165/85 99.2  F (37.3  C) Oral 75 16 97 % -- --       Physical Exam:  General: NAD, resting  CV:RRR  LUNGS:CTA bilaterally  ABD: soft, mild distention, seems to not have any pain - does note wake or wince with palpation  EXT:no CCE     Lab Results   Component Value Date    WBC 10.2 10/14/2021    HGB 11.3 10/14/2021    HCT 33.9 10/14/2021    MCV 90 10/14/2021     10/14/2021     INR/Prothrombin Time  Recent Labs   Lab 10/14/21  0314      CO2 21*   BUN 11     Lab Results   Component Value Date    ALT 11 10/13/2021    AST 16 10/13/2021    ALKPHOS 96 10/13/2021         "

## 2021-10-14 NOTE — H&P
Admission History and Physical   Liza Camacho,  1945, MRN [id number]    [unfilled]  Hypokalemia [E87.6]  Abdominal distension [R14.0]  Kassie syndrome [K59.81]    PCP: Jenna Montoya MD, [unfilled], None   Code status:  No CPR- Do NOT Intubate       Extended Emergency Contact Information  Primary Emergency Contact: Lydia Richards  Work Phone: [phone]  Mobile Phone: [phone]  Relation: Sister  Secondary Emergency Contact: Frances Morales  Home Phone: [phone]  Mobile Phone: [phone]  Relation: Sister       Assessment and Plan   Active Problems:    Hypokalemia    Abdominal distension    [place] syndrome      Cheyenne is a 76-year-old with past medical history significant for severe dementia previously on hospice prior to admission who presents with one day history of abdominal distention and was 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    #[place] syndrome  Per sister, patient has had 2-day history of significant abdominal distention.  Patient is nonverbal at baseline so unclear how painful this episode been however her mentation has not changed from baseline. No nausea/vomiting. Per sister her bowel movement yesterday was only diarrhea. Work-up in the ED was remarkable for a CT scan showing diffuse colonic distention consistent with [place] syndrome.  ED provider and myself discussed with 2 sisters about management of care in the setting of previous hospice at nursing home.  At this point,  given patient is so distended they would like further intervention in hopes of better comfort.  Surgery was consulted in the ED, per their recommendations she will likely get a rectal tube tonight.  He recommended consulting GI as patient may need neostigmine.  -NPO  -mIVF  -Surgery consult -- possible rectal tube tonight  -GI consult    #Severe dementia  #Previous hospice   Patient is nonverbal at baseline and lives in [facility].  Her 2 sisters were  present on admission and we discussed comfort cares versus hospice at discharge as patient was previously on hospice. Her sister had a difficult decision of revoking hospice or not but in the setting of patient likely being uncomfortable with significant abdominal distention this was revoked.  Both sisters would like further hospice discussion following clearance of acute abdominal distention.    -Home on hospice most likely  -Continue PTA Haldol, memantine, risperidone, and Zoloft     #Hypokalemia   Patient's potassium on admission is 3.1. EKG normal on admission.   -RN potassium protocol    #Chronic   #HTN   -Continue PTA amlodipine    Medication Reconciliation Status: Complete    FEN: mIVF  DVT Prophylaxis: Lovenox  Code: DNR-DNI    Dispo: observation status for abdominal distention/decompression, anticipate discharge to long term care center   Barriers to discharge:  work up in progress   Anticipated discharge date:  within 24 hours     Chief Complaint: Abdominal distention     HPI:    Liza Camacho is a 76 year old female with PMH significant for severe dementia, nonverbal at baseline and previous hospice patient who presents today with 1 day history of significant abdominal distention.  Patient is nonverbal at baseline and unable to communicate pain however sister notes patient has had this severe abdominal distention one episode of diarrhea for the last day.  She has not noticed any significant change in mentation.  She feels she is at her current baseline mental status.  She denies any URI type symptoms or change in appetite.  She eats.  Food at baseline and says she eats 75 to 100% of her meals.    Patient was recently started on hospice in June for severe dementia.  She has been doing well since that time.  She is eating the puréed foods and since starting Risperdal she is happy and upbeat and has no aggressive episodes as she did prior to starting this medication.  Her sister notes she stopped the  hospice in order to have patient hospitalized today.    History is provided by ED provider and sisters       Medical History  History reviewed. No pertinent past medical history.   Alzheimers, vascular dementia, hypertension    Surgical History  Reviewed and She  has no past surgical history on file.   Social History  Reviewed and she  comes from a long term care facility   Allergies  No Known Allergies Family History  Reviewed and noncontributory except as noted below or in HPI.  family history is not on file.        Prior to Admission Medications   Medications Prior to Admission   Medication Sig Dispense Refill Last Dose     acetaminophen (TYLENOL) 500 MG tablet Take 1,000 mg by mouth every 8 hours as needed for mild pain        acetaminophen (TYLENOL) 650 MG suppository Place 650 mg rectally every 8 hours as needed (temp greater than 101)        amLODIPine (NORVASC) 10 MG tablet Take 1 tablet (10 mg) by mouth daily   10/13/2021 at Unknown time     aspirin (ASA) 81 MG chewable tablet Take 81 mg by mouth daily   10/13/2021 at Unknown time     haloperidol (HALDOL) 0.5 mg solutab Place 1 mg under the tongue every 4 hours as needed for nausea (delirium)        memantine (NAMENDA) 10 MG tablet Take 1 tablet (10 mg) by mouth 2 times daily   10/13/2021 at am     miconazole (MICATIN) 2 % AERP powder Apply 1 applicator topically 2 times daily as needed (yeasty rash under breast & abdominal folds)        morphine 5 MG solu-tab Place 5 mg under the tongue every hour as needed for shortness of breath / dyspnea or pain        nitroGLYcerin (NITROSTAT) 0.4 MG sublingual tablet Place 0.4 mg under the tongue every 5 minutes as needed for chest pain For chest pain place 1 tablet under the tongue every 5 minutes for 3 doses. If symptoms persist 5 minutes after 1st dose call 911.        risperiDONE (RISPERDAL) 0.5 MG tablet Take 0.5 mg by mouth 2 times daily   10/13/2021 at am     sertraline (ZOLOFT) 100 MG tablet Take 100 mg by  mouth daily   10/13/2021 at Unknown time     UNABLE TO FIND 0.4 mg by Orally disintegrating tablet route every 8 hours as needed (increased congestion) MEDICATION NAME: Atropine 4 mg solutab           Review of Systems:  A comprehensive review of systems was negative.      Physical Exam:  Temp:  [98.7  F (37.1  C)-99.2  F (37.3  C)] 98.7  F (37.1  C)  Pulse:  [69-88] 88  Resp:  [16-21] 18  BP: (151-188)/(77-95) 181/85  SpO2:  [91 %-98 %] 91 %    General appearance: non-verbal, mumbles, alert, appears stated age, cooperative and no distress  Head: Normocephalic, atraumatic  Eyes: conjunctivae/corneas clear  Throat: MMM, saliva around mouth  Neck: no JVD and supple, symmetrical, trachea midline  Lungs: clear to auscultation bilaterally, no increased WOB  Heart: regular rate and rhythm, S1, S2 normal, no murmur, click, rub or gallop  Abdomen: significant abdominal distention, hypoactive bowel sounds, does not flinch with palpation of abdomen, soft, non-tender  Extremities: extremities atraumatic, no cyanosis or edema, radial and DP pulses palpable bilaterally  Skin: Skin color, texture, turgor normal. No rashes or lesions  Neurologic: awake, non-verbal, does not respond to questions, mumbles, normal strength and tone  Psychiatric: mood and affect appropriate. Not present to surroundings.     Pertinent Labs  Lab Results: personally reviewed.   Results for orders placed or performed during the hospital encounter of 10/13/21 (from the past 24 hour(s))   Glucose by meter   Result Value Ref Range    GLUCOSE BY METER POCT 152 (H) 70 - 99 mg/dL   [facility] Draw    Narrative    The following orders were created for panel order [facility] Draw.  Procedure                               Abnormality         Status                     ---------                               -----------         ------                     Extra Blue Top Tube[[id number]]                              Final result               Extra Red Top Tube[[id number]]                                Final result               Extra Green Top (Lithium...[[id number]]                      Final result               Extra Purple Top Tube[[id number]]                            Final result                 Please view results for these tests on the individual orders.   INR   Result Value Ref Range    INR 1.10 0.85 - 1.15   PTT   Result Value Ref Range    aPTT 30 22 - 38 Seconds   Asymptomatic COVID-19 Virus (Coronavirus) by PCR Nasopharyngeal    Specimen: Nasopharyngeal; Swab   Result Value Ref Range    SARS CoV2 PCR Negative Negative    Narrative    Testing was performed using the anabell  SARS-CoV-2 & Influenza A/B Assay on the anabell  Cierra  System.  This test should be ordered for the detection of SARS-COV-2 in individuals who meet SARS-CoV-2 clinical and/or epidemiological criteria. Test performance is unknown in asymptomatic patients.  This test is for in vitro diagnostic use under the FDA EUA for laboratories certified under CLIA to perform moderate and/or high complexity testing. This test has not been FDA cleared or approved.  A negative test does not rule out the presence of PCR inhibitors in the specimen or target RNA in concentration below the limit of detection for the assay. The possibility of a false negative should be considered if the patient's recent exposure or clinical presentation suggests COVID-19.  [facility] are certified under the Clinical Laboratory Improvement Amendments of 1988 (CLIA-88) as qualified to perform moderate and/or high complexity laboratory testing.   Extra Blue Top Tube   Result Value Ref Range    Hold Specimen JIC    CBC (+ platelets, no diff)   Result Value Ref Range    WBC Count 12.2 (H) 4.0 - 11.0 10e3/uL    RBC Count 4.73 3.80 - 5.20 10e6/uL    Hemoglobin 13.9 11.7 - 15.7 g/dL    Hematocrit 42.0 35.0 - 47.0 %    MCV 89 78 - 100 fL    MCH 29.4 26.5 - 33.0 pg    MCHC 33.1 31.5 - 36.5 g/dL    RDW 14.6 10.0 - 15.0 %    Platelet Count  250 150 - 450 10e3/uL   Basic metabolic panel   Result Value Ref Range    Sodium 141 136 - 145 mmol/L    Potassium 3.1 (L) 3.5 - 5.0 mmol/L    Chloride 109 (H) 98 - 107 mmol/L    Carbon Dioxide (CO2) 21 (L) 22 - 31 mmol/L    Anion Gap 11 5 - 18 mmol/L    Urea Nitrogen 13 8 - 28 mg/dL    Creatinine 0.78 0.60 - 1.10 mg/dL    Calcium 10.1 8.5 - 10.5 mg/dL    Glucose 152 (H) 70 - 125 mg/dL    GFR Estimate 75 >60 mL/min/1.73m2   Hepatic function panel   Result Value Ref Range    Bilirubin Total 0.8 0.0 - 1.0 mg/dL    Bilirubin Direct 0.2 <=0.5 mg/dL    Protein Total 7.3 6.0 - 8.0 g/dL    Albumin 3.5 3.5 - 5.0 g/dL    Alkaline Phosphatase 96 45 - 120 U/L    AST 16 0 - 40 U/L    ALT 11 0 - 45 U/L   Lipase   Result Value Ref Range    Lipase 11 0 - 52 U/L   Lactic acid whole blood   Result Value Ref Range    Lactic Acid 1.1 0.7 - 2.0 mmol/L   Extra Red Top Tube   Result Value Ref Range    Hold Specimen JIC    Extra Green Top (Lithium Heparin) Tube   Result Value Ref Range    Hold Specimen JIC    Extra Purple Top Tube   Result Value Ref Range    Hold Specimen JIC    UA with Microscopic reflex to Culture    Specimen: Urine, Clean Catch   Result Value Ref Range    Color Urine Yellow Colorless, Straw, Light Yellow, Yellow    Appearance Urine Clear Clear    Glucose Urine Negative Negative mg/dL    Bilirubin Urine Negative Negative    Ketones Urine Negative Negative mg/dL    Specific Gravity Urine 1.031 (H) 1.001 - 1.030    Blood Urine Negative Negative    pH Urine 5.5 5.0 - 7.0    Protein Albumin Urine 30  (A) Negative mg/dL    Urobilinogen Urine <2.0 <2.0 mg/dL    Nitrite Urine Negative Negative    Leukocyte Esterase Urine Negative Negative    RBC Urine 1 <=2 /HPF    WBC Urine <1 <=5 /HPF    Narrative    Urine Culture not indicated   Head CT w/o contrast    Narrative    EXAM: CT HEAD W/O CONTRAST  LOCATION: [facility]  DATE/TIME: 10/13/2021 4:20 PM    INDICATION: Decreased mental status.  COMPARISON:  None.  TECHNIQUE: Routine CT Head without IV contrast. Multiplanar reformats. Dose reduction techniques were used.    FINDINGS:  INTRACRANIAL CONTENTS: No intracranial hemorrhage. 2.2 cm bulky extra-axial calcification in the anterolateral right middle cranial fossa is in communication with the adjacent calvarial inner table. This likely reflects a densely calcified meningioma   with mild mass effect on the adjacent right temporal lobe. Smaller focus of calcification along the lateral left middle cranial fossa potentially relates to hyperostosis. Severe presumed chronic small vessel ischemic changes. Small amount of   encephalomalacia in the lateral right temporal lobe with accompanying ex vacuo dilatation of the adjacent atrium and temporal horn of the right lateral ventricle. Moderate prominence of the bilateral lateral ventricles and third ventricles is accompanied   by a moderate prominence of the bilateral sylvian fissures. This likely relates to a moderate diffuse parenchymal volume loss. Calcification of the distal internal carotid arteries bilaterally.    VISUALIZED ORBITS/SINUSES/MASTOIDS: No intraorbital abnormality. No paranasal sinus mucosal disease. No middle ear or mastoid effusion.    BONES/SOFT TISSUES: Degenerative change at the bilateral temporomandibular joint. Partially calcified lesion in the left parietal scalp likely reflects a partially calcified sebaceous cyst.      Impression    IMPRESSION:  1.  No acute intracranial abnormality.    2.  Severe burden chronic small vessel ischemic change with a small amount of encephalomalacia in the posterior right temporal lobe. This is accompanied by a moderate diffuse parenchymal volume loss.    3.  Calcified extra-axial mass measuring up to 2.2 cm in diameter in the anterolateral right middle cranial fossa likely reflects a densely calcified meningioma.   CT Chest/Abdomen/Pelvis w Contrast    Narrative    EXAM: CT CHEST/ABDOMEN/PELVIS W  CONTRAST  LOCATION: [facility]  DATE/TIME: 10/13/2021 4:20 PM    INDICATION: Abdominal distention. Nonverbal.  COMPARISON: 07/15/2021 CT  TECHNIQUE: CT scan of the chest, abdomen, and pelvis was performed following injection of IV contrast. Multiplanar reformats were obtained. Dose reduction techniques were used.   CONTRAST: isovue 370  100ml    FINDINGS:   LUNGS AND PLEURA: Trace bilateral pleural effusions. Stable left basilar scar.    MEDIASTINUM/AXILLAE: Left thyroid 1.2 cm hypodense nodule. Distended fluid-filled esophagus with small sliding hiatal hernia.    CORONARY ARTERY CALCIFICATION: Mild.    HEPATOBILIARY: Normal.    PANCREAS: Partially atrophic.    SPLEEN: Normal.    ADRENAL GLANDS: Normal.    KIDNEYS/BLADDER: Small bilateral renal cysts. No further imaging recommended.    BOWEL: Markedly distended colon and rectum. Maximum diameter is in the transverse colon measuring 9.7 cm. The same loop of bowel previously measured 7.3 cm. Small amount fluid in rectum. No evidence impaction. No pneumatosis. Small bowel appears normal.    LYMPH NODES: Normal.    VASCULATURE: Unremarkable.    PELVIC ORGANS: Normal.    MUSCULOSKELETAL: Normal.      Impression    IMPRESSION:  1.  Increasing diffuse colonic distention, consistent with Kassie syndrome.  2.  Gastroesophageal reflux with fluid distended esophagus. The patient is at risk for aspiration.  3.  Left thyroid 1.2 cm nodule. No further imaging per guidelines below.    REFERENCE:  Alfred MALIK et al. Managing Incidental Thyroid Nodules Detected on Imaging: White Paper of the ACR Incidental Thyroid Findings Committee. JACR 2015; 12:143-150.    Incidental thyroid nodule detected on CT or MRI without suspicious findings. Applies to general population without limited life expectancy or significant comorbidities.    Age greater than or equal to 35 years  Less than 1.5 cm: No further evaluation.  Greater than or equal to 1.5 cm: Evaluate with  "thyroid ultrasound.   Social Work/ Care Management IP Consult    Narrative    Luisa Gonzalez RN     10/13/2021  8:05 PM  Care Management Initial Consult    General Information  Assessment completed with: Family, Lydia sister  Type of CM/SW Visit: Initial Assessment    Primary Care Provider verified and updated as needed: Yes   Readmission within the last 30 days: no previous admission in   last 30 days      Reason for Consult: discharge planning  Advance Care Planning:            Communication Assessment  Patient's communication style: spoken language (English or   Bilingual) (\"nonverbal at baseline\")    Hearing Difficulty or Deaf: no   Wear Glasses or Blind: no    Cognitive  Cognitive/Neuro/Behavioral:  (dementia at [facility])                        Living Environment:   People in home: facility resident     Current living Arrangements: extended care facility  Name of   Facility: [facility]   Able to return to prior arrangements: yes (may need hospice   involvement)       Family/Social Support:  Care provided by: other (see comments) (LTC)  Provides care for: no one, unable/limited ability to care for   self     Sibling(s), Other (specify) (niece)          Description of Support System: Supportive, Involved    Support Assessment: Adequate family and caregiver support,   Adequate social supports, Patient communicates needs well met    Current Resources:   Patient receiving home care services: No     Community Resources: Skilled Nursing Facility ([facility]   [place])  Equipment currently used at home: hospital bed, lift device   (\"mamie lift\")  Supplies currently used at home: Incontinence Supplies    Employment/Financial:  Employment Status: retired        Financial Concerns:     Referral to Financial Counselor: No       Lifestyle & Psychosocial Needs:  Social Determinants of Health     Tobacco Use:      Smoking Tobacco Use:      Smokeless Tobacco Use:    Alcohol Use:      Frequency of Alcohol " Consumption:      Average Number of Drinks:      Frequency of Binge Drinking:    Financial Resource Strain:      Difficulty of Paying Living Expenses:    Food Insecurity:      Worried About Running Out of Food in the Last Year:      Ran Out of Food in the Last Year:    Transportation Needs:      Lack of Transportation (Medical):      Lack of Transportation (Non-Medical):    Physical Activity:      Days of Exercise per Week:      Minutes of Exercise per Session:    Stress:      Feeling of Stress :    Social Connections:      Frequency of Communication with Friends and Family:      Frequency of Social Gatherings with Friends and Family:      Attends Gnosticist Services:      Active Member of Clubs or Organizations:      Attends Club or Organization Meetings:      Marital Status:    Intimate Partner Violence:      Fear of Current or Ex-Partner:      Emotionally Abused:      Physically Abused:      Sexually Abused:    Depression:      PHQ-2 Score:    Housing Stability:      Unable to Pay for Housing in the Last Year:      Number of Places Lived in the Last Year:      Unstable Housing in the Last Year:        Functional Status:  Prior to admission patient needed assistance:   Dependent ADLs:: Bathing, Dressing, Eating, Grooming,   Incontinence, Transfers, Toileting, Wheelchair-with assist  Dependent IADLs:: Cleaning, Cooking, Laundry, Shopping, Meal   Preparation, Medication Management, Transportation, Incontinence       Mental Health Status:          Chemical Dependency Status:                Values/Beliefs:  Spiritual, Cultural Beliefs, Gnosticist Practices, Values that   affect care:                 Additional Information:  Liza lives at [facility]. She is total cares at   baseline. She has dementia and is non-verbal. They use a mamie   lift for her and she is normally either in the hospital bed or a   reclining teddy chair.    Family may be interested in Hospice services and Comfort Cares.    [facility]  transport at discharge.    Luisa Gonzalez RN         Pertinent Radiology  Radiology Results: Personally reviewed image/s and personally reviewed impression/s    This note was created with the use of voice recognition dictation technology, and as such there may be unintended typographical or voice recognition errors that were not corrected during proofreading.    ECG Results: Sinus rhythm, no ST changes    Precepted patient with Dr. Mick Ríos MD  [facility], PGY-1  Pager # [phone]

## 2021-10-14 NOTE — PLAN OF CARE
Problem: Electrolyte Imbalance  Goal: Electrolyte Balance  Outcome: No Change   Pt is receiving IV potassium replacement per protocol.     Problem: Diarrhea  Goal: Fluid and Electrolyte Balance  Outcome: No Change   Rectal tube in place with lg amount of liquid stool in drainage bag.   Small amount of stool bypassed rectal tube. Hygiene provided.     Problem: Adult Inpatient Plan of Care  Goal: Optimal Comfort and Wellbeing  Outcome: Improving   No nonverbal signs or sx of pain. Abdomen palpated and no grimacing or any changes noted to pt.     Problem: Adult Inpatient Plan of Care  Goal: Readiness for Transition of Care  Outcome: No Change   Not ready to return to facility.

## 2021-10-14 NOTE — DISCHARGE SUMMARY
[facility]  Discharge Summary - Medicine & Pediatrics       Date of Admission:  10/13/2021  Date of Discharge:  10/14/2021  Discharging Provider: Dr. Ríos    Discharge Diagnoses   Kassie Syndrome    Follow-ups Needed After Discharge   No additional follow-ups    Unresulted Labs Ordered in the Past 30 Days of this Admission     No orders found for last 31 day(s).          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  The following problems were addressed during her hospitalization:    #Kassie syndrome  Per sister, patient has had 2-day history of significant abdominal distention.  Patient is nonverbal at baseline so unclear how painful this episode been however her mentation has not changed from baseline. CT scan showed diffuse colonic distention consistent with Kassie syndrome. Surgery and GI were consulted. Surgery placed a rectal tube with great improvement of her abdominal distention. Patient was back to baseline when she left. GI evaluated patient as well and believed this was a chronic issue and no further intervention was needed. They recommended frequent moving of patient to help but this rarely causes significant clinical problems. This is likely to return as patient has dementia and medications to cause recurrent episodes. Discussed with family about likelihood of returning and in setting of hospice could manage symptomatically (pain meds/rectal stimulation) without returning to the hospital.     #Chronic reflux/aspiration  Patient's CT scan was consistent with reflux and concern for aspiration events. GI recommended making sure patient sits up with eating, remains sitting for a half an hour and try having her bed in reverse Trendelenburg chronically. He also recommended position and ambulation would be helpful with this as well. Nothing  acutely was done for this during this hospital stay.     #Hospice   Patient was previously on hospice prior to admission. Discussed with care management and family. No new order for hospice needed, but will place in discharge orders she is okay to return to [facility], if family so chooses and she qualifies.    Consultations This Hospital Stay   SOCIAL WORK IP CONSULT  GASTROENTEROLOGY IP CONSULT  SURGERY GENERAL IP CONSULT    Code Status   No CPR- Pre-arrest intubation OK       The patient was discussed with Dr. Rodrigez.    Cristal Ríos MD  [address] [address]  Phone: [phone]  Fax: [phone]  ______________________________________________________________________    Physical Exam   Vital Signs: Temp: 97.7  F (36.5  C) Temp src: Axillary BP: (!) 140/71 Pulse: 63   Resp: 18 SpO2: 99 % O2 Device: None (Room air)    Weight: 159 lbs 4.8 oz  Constitutional: awake, alert, cooperative, no apparent distress, and appears stated age  Eyes: Lids and lashes normal, pupils equal, round and reactive to light, extra ocular muscles intact, sclera clear, conjunctiva normal  ENT: Normocephalic, without obvious abnormality, atraumatic, sinuses nontender on palpation, external ears without lesions, oral pharynx with moist mucous membranes, tonsils without erythema or exudates, gums normal and good dentition.  Hematologic / Lymphatic: no cervical lymphadenopathy  Respiratory: No increased work of breathing, good air exchange, clear to auscultation bilaterally, no crackles or wheezing  Cardiovascular: Normal apical impulse, regular rate and rhythm, normal S1 and S2, no S3 or S4, and no murmur noted  GI: No scars, normal bowel sounds, soft, non-distended, non-tender, no masses palpated, no hepatosplenomegally  Skin: no bruising or bleeding and normal skin color, texture, turgor  Neurologic: Non verbal at baseline, non responsive to name but follows  words.       Primary Care Physician   Jenna Montoya MD    Discharge Orders      General info for SNF    Length of Stay Estimate: Long Term Care  Condition at Discharge: Stable  Level of care:skilled   Rehabilitation Potential: Poor  Admission H&P remains valid and up-to-date: Yes  Recent Chemotherapy: N/A  Use Nursing Home Standing Orders: Yes     Follow Up and recommended labs and tests    No followup needed -- possibility of returning. May have nursing facility perform rectal exam or provide pain medications if appears symptomatic. Likely a chronic issue.    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 Recommend rectal exam/stimulation if happens again or symptom management.     Activity - Up with nursing assistance     No CPR- Pre-arrest intubation OK     Fall precautions     Advance Diet as Tolerated    Follow this diet upon discharge: Orders Placed This Encounter      Pureed Diet (level 4) Extremely Thick (level 4) (Sit patient up right with eating)       Significant Results and Procedures   Results for orders placed or performed during the hospital encounter of 10/13/21   Head CT w/o contrast    Narrative    EXAM: CT HEAD W/O CONTRAST  LOCATION: [facility]  DATE/TIME: 10/13/2021 4:20 PM    INDICATION: Decreased mental status.  COMPARISON: None.  TECHNIQUE: Routine CT Head without IV contrast. Multiplanar reformats. Dose reduction techniques were used.    FINDINGS:  INTRACRANIAL CONTENTS: No intracranial hemorrhage. 2.2 cm bulky extra-axial calcification in the anterolateral right middle cranial fossa is in communication with the adjacent calvarial inner table. This likely reflects a densely calcified meningioma   with mild mass effect on the adjacent right temporal lobe. Smaller focus of calcification along the lateral left middle cranial fossa  potentially relates to hyperostosis. Severe presumed chronic small vessel ischemic changes. Small amount of   encephalomalacia in the lateral right temporal lobe with accompanying ex vacuo dilatation of the adjacent atrium and temporal horn of the right lateral ventricle. Moderate prominence of the bilateral lateral ventricles and third ventricles is accompanied   by a moderate prominence of the bilateral sylvian fissures. This likely relates to a moderate diffuse parenchymal volume loss. Calcification of the distal internal carotid arteries bilaterally.    VISUALIZED ORBITS/SINUSES/MASTOIDS: No intraorbital abnormality. No paranasal sinus mucosal disease. No middle ear or mastoid effusion.    BONES/SOFT TISSUES: Degenerative change at the bilateral temporomandibular joint. Partially calcified lesion in the left parietal scalp likely reflects a partially calcified sebaceous cyst.      Impression    IMPRESSION:  1.  No acute intracranial abnormality.    2.  Severe burden chronic small vessel ischemic change with a small amount of encephalomalacia in the posterior right temporal lobe. This is accompanied by a moderate diffuse parenchymal volume loss.    3.  Calcified extra-axial mass measuring up to 2.2 cm in diameter in the anterolateral right middle cranial fossa likely reflects a densely calcified meningioma.   CT Chest/Abdomen/Pelvis w Contrast    Narrative    EXAM: CT CHEST/ABDOMEN/PELVIS W CONTRAST  LOCATION: [facility]  DATE/TIME: 10/13/2021 4:20 PM    INDICATION: Abdominal distention. Nonverbal.  COMPARISON: 07/15/2021 CT  TECHNIQUE: CT scan of the chest, abdomen, and pelvis was performed following injection of IV contrast. Multiplanar reformats were obtained. Dose reduction techniques were used.   CONTRAST: isovue 370  100ml    FINDINGS:   LUNGS AND PLEURA: Trace bilateral pleural effusions. Stable left basilar scar.    MEDIASTINUM/AXILLAE: Left thyroid 1.2 cm hypodense nodule.  Distended fluid-filled esophagus with small sliding hiatal hernia.    CORONARY ARTERY CALCIFICATION: Mild.    HEPATOBILIARY: Normal.    PANCREAS: Partially atrophic.    SPLEEN: Normal.    ADRENAL GLANDS: Normal.    KIDNEYS/BLADDER: Small bilateral renal cysts. No further imaging recommended.    BOWEL: Markedly distended colon and rectum. Maximum diameter is in the transverse colon measuring 9.7 cm. The same loop of bowel previously measured 7.3 cm. Small amount fluid in rectum. No evidence impaction. No pneumatosis. Small bowel appears normal.    LYMPH NODES: Normal.    VASCULATURE: Unremarkable.    PELVIC ORGANS: Normal.    MUSCULOSKELETAL: Normal.      Impression    IMPRESSION:  1.  Increasing diffuse colonic distention, consistent with [place] syndrome.  2.  Gastroesophageal reflux with fluid distended esophagus. The patient is at risk for aspiration.  3.  Left thyroid 1.2 cm nodule. No further imaging per guidelines below.    REFERENCE:  Alfred MALIK et al. Managing Incidental Thyroid Nodules Detected on Imaging: White Paper of the ACR Incidental Thyroid Findings Committee. JACR 2015; 12:143-150.    Incidental thyroid nodule detected on CT or MRI without suspicious findings. Applies to general population without limited life expectancy or significant comorbidities.    Age greater than or equal to 35 years  Less than 1.5 cm: No further evaluation.  Greater than or equal to 1.5 cm: Evaluate with thyroid ultrasound.       Discharge Medications   Current Discharge Medication List      CONTINUE these medications which have NOT CHANGED    Details   acetaminophen (TYLENOL) 500 MG tablet Take 1,000 mg by mouth every 8 hours as needed for mild pain      acetaminophen (TYLENOL) 650 MG suppository Place 650 mg rectally every 8 hours as needed (temp greater than 101)      amLODIPine (NORVASC) 10 MG tablet Take 1 tablet (10 mg) by mouth daily    Associated Diagnoses: Essential hypertension      aspirin (ASA) 81 MG chewable  tablet Take 81 mg by mouth daily      haloperidol (HALDOL) 0.5 mg solutab Place 1 mg under the tongue every 4 hours as needed for nausea (delirium)      memantine (NAMENDA) 10 MG tablet Take 1 tablet (10 mg) by mouth 2 times daily    Associated Diagnoses: Alzheimer's dementia without behavioral disturbance, unspecified timing of dementia onset (H)      miconazole (MICATIN) 2 % AERP powder Apply 1 applicator topically 2 times daily as needed (yeasty rash under breast & abdominal folds)      morphine 5 MG solu-tab Place 5 mg under the tongue every hour as needed for shortness of breath / dyspnea or pain      nitroGLYcerin (NITROSTAT) 0.4 MG sublingual tablet Place 0.4 mg under the tongue every 5 minutes as needed for chest pain For chest pain place 1 tablet under the tongue every 5 minutes for 3 doses. If symptoms persist 5 minutes after 1st dose call 911.      risperiDONE (RISPERDAL) 0.5 MG tablet Take 0.5 mg by mouth 2 times daily      sertraline (ZOLOFT) 100 MG tablet Take 100 mg by mouth daily      UNABLE TO FIND 0.4 mg by Orally disintegrating tablet route every 8 hours as needed (increased congestion) MEDICATION NAME: Atropine 4 mg solutab           Allergies   No Known Allergies

## 2021-10-14 NOTE — CONSULTS
[facility] Gastroenterology Consultation    Liza Camacho MRN# [id number]   Age: 76 year old YOB: 1945     Date of Admission:  10/13/2021    Reason for consult:  CT findings of colonic distention and reflux.       Requesting physician:  Dr. Gardner       Level of consult: Consult, follow and place orders           Assessment and Plan:   Assessment:   Data:, Chronic, likely secondary to chronic Haldol and inactivity.  This is been present for at least several months.  She has no symptoms related.  Frequent moving the patient may be helpful, but this rarely causes significant clinical problems.  I am a bit worried about her reflux.  She should be setting up when eating, remains sitting for at least a half an hour after.  Her bed should be reverse Trendelenburg chronically.  Again frequent changing body position and potential ambulation would be helpful.  Check TSH.  Electrolytes are normal.  No further clinical intervention is otherwise recommended.      Plan:   Please see above.             Chief Complaint:   Abnormal CT       Distention of colon and esophagus by CT         History of Present Illness:   This patient is a 76 year old  female with a significant past medical history of dementia who presents with the following condition requiring a hospital admission: Was admitted with abdominal distention.  Going back through previous CTs does not appear to be much different from several months ago.  The patient has no complaints.  We are able to palpate without any problem.  She is been on chronic Haldol for severe dementia and has been in active.  CT also shows significant reflux and risk of aspiration.  Patient is uncommunicative.  Her electrolytes are normal.  I do not see a recent TSH.  Her med list does not show any narcotics but she does have Haldol which appears to be chronic.  No history of aspiration.     Abnormal CT           Past Medical History:   I have reviewed  this patient's past medical history          Past Surgical History:   I have reviewed this patient's past surgical history          Social History:   I have reviewed this patient's social history          Family History:   I have reviewed this patient's family history          Immunizations:   Immunizations are up to date          Allergies:   All allergies reviewed and addressed          Medications:     Current Facility-Administered Medications   Medication     acetaminophen (TYLENOL) tablet 650 mg    Or     acetaminophen (TYLENOL) Suppository 650 mg     amLODIPine (NORVASC) tablet 10 mg     aspirin (ASA) chewable tablet 81 mg     enoxaparin ANTICOAGULANT (LOVENOX) injection 40 mg     haloperidol (HALDOL) tablet 1 mg     lidocaine (LMX4) cream     lidocaine 1 % 0.1-1 mL     melatonin tablet 1 mg     memantine (NAMENDA) tablet 10 mg     ondansetron (ZOFRAN-ODT) ODT tab 4 mg    Or     ondansetron (ZOFRAN) injection 4 mg     potassium chloride 10 mEq in 100 mL sterile water intermittent infusion (premix)     prochlorperazine (COMPAZINE) injection 5 mg    Or     prochlorperazine (COMPAZINE) tablet 5 mg    Or     prochlorperazine (COMPAZINE) suppository 12.5 mg     risperiDONE (risperDAL) tablet 0.5 mg     senna-docusate (SENOKOT-S/PERICOLACE) 8.6-50 MG per tablet 1 tablet    Or     senna-docusate (SENOKOT-S/PERICOLACE) 8.6-50 MG per tablet 2 tablet     sertraline (ZOLOFT) tablet 100 mg     sodium chloride (PF) 0.9% PF flush 3 mL     sodium chloride (PF) 0.9% PF flush 3 mL     sodium chloride 0.9% infusion             Review of Systems:   {   Brief ROS                      patient unable to give a review of systems     -  Physical exam: Mental status severe dementia    HEENT is normal    No nodes in the inguinal periumbilical or clavicular area    Chest is clear to auscultation    Cardiovascular exam shows a regular rate and rhythm.    Abdominal: Distended, soft, nontender to palpation.  Bowel sounds are  decreased.    No peripheral edema.    Neurologic: Unable to.       Data:   All laboratory data reviewed    All imaging studies reviewed by me.    Attestation:  I have reviewed today's vital signs, notes, medications, labs and imaging.    Jay Pena MD

## 2021-10-14 NOTE — PLAN OF CARE
Problem: Confusion Chronic  Goal: Optimal Cognitive Function  Outcome: No Change   Pt has been drowsy,no s/s of pain noted,turned and repositioned  Problem: Diarrhea  Goal: Fluid and Electrolyte Balance  Outcome: No Change   Abdomen soft and BS present,rectal tube in place,brown liquid stool in the bag  Problem: Electrolyte Imbalance  Goal: Electrolyte Balance  Outcome: Improving   Potassium 2.9 at 0314,Iv potassium infusing.

## 2021-10-14 NOTE — PLAN OF CARE
Problem: Electrolyte Imbalance  Goal: Electrolyte Balance  Outcome: No Change     Problem: Constipation  Goal: Effective Bowel Elimination  Outcome: No Change     Problem: Confusion Chronic  Goal: Optimal Cognitive Function  Outcome: No Change     Patient  A and O not even to self. Non verbal and non communicative.  No behaviors this shift. Patient did not answer questions or follow with eyes.   Patient on 100 ml/hr NS. Patient abdomen distended and firm when arriving on P4. Surgeon assessed patient and then inserted a rectal tube. Proved successful. Abdomen now lower and WNL.   Patient took all meds crushed in apples sauce.   Patient on K protocol. 3.1 at last check. Potassium liquid given. Patient next check at 02:30.

## 2021-10-14 NOTE — CONSULTS
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  It is remarkable however that this scan is not significantly different than an abdominal CT scan she had in July 2021.  I did perform a rectal exam and I did not appreciate any lumen obstructing lesion.  1. Abdominal distension    2. Hypokalemia    3. 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  Patient was evaluated with a CT scan and found to have significantly distended colon throughout.  Her transverse colon measures 9.7 cm in diameter.  The air in the colon progresses right down to the rectum.  Despite that the patient does not seem to be in significant distress or to be having acute abdominal pain.  There is no report of nausea and vomiting.  On the CT scan it also shows that the stomach is decompressed as is most of the small intestine.       PAST MEDICAL HISTORY SURGICAL HISTORY     History reviewed. No pertinent past medical history. History reviewed. No pertinent surgical history.       CURRENT MEDICATIONS ALLERGIES     No current outpatient medications on file.    No Known Allergies       FAMILY HISTORY   History reviewed. No pertinent family history.      SOCIAL HISTORY     Social History     Socioeconomic History     Marital status: Single     Spouse name: None     Number of children: None     Years of education: None     Highest education level: None   Occupational History     None   Tobacco Use     Smoking status: None   Substance and Sexual Activity      Alcohol use: None     Drug use: None     Sexual activity: None   Other Topics Concern     None   Social History Narrative     None     Social Determinants of Health     Financial Resource Strain:      Difficulty of Paying Living Expenses:    Food Insecurity:      Worried About Running Out of Food in the Last Year:      Ran Out of Food in the Last Year:    Transportation Needs:      Lack of Transportation (Medical):      Lack of Transportation (Non-Medical):    Physical Activity:      Days of Exercise per Week:      Minutes of Exercise per Session:    Stress:      Feeling of Stress :    Social Connections:      Frequency of Communication with Friends and Family:      Frequency of Social Gatherings with Friends and Family:      Attends Yazidism Services:      Active Member of Clubs or Organizations:      Attends Club or Organization Meetings:      Marital Status:    Intimate Partner Violence:      Fear of Current or Ex-Partner:      Emotionally Abused:      Physically Abused:      Sexually Abused:          REVIEW OF SYSTEMS       12 point review of systems are unremarkable except for the symptoms described in the HPI    PHYSICAL EXAM     VITAL SIGNS: BP (!) 172/95   Pulse 87   Temp 99.2  F (37.3  C) (Oral)   Resp 21   SpO2 97%    General : Alert, cooperative, appears stated age   Skin: Skin color, texture, turgor normal, no rashes or lesions   Lymph nodes: No obvious adenopathy   Head: Normocephalic, without obvious abnormality, atraumatic   HEENT:  conjunctiva/corneas clear, EOM's intact, no scleral icterus   Throat: Lips, mucosa, and tongue normal; teeth and gums normal    Neck: Supple, thyroid not enlarged   Back: No CVA tenderness   Lungs: Clear to auscultation bilaterally, respirations unlabored, no rales, rhonchi or wheezes   Chest Wall:No tenderness or deformity   Heart: Regular rate and rhythm, S1, S2 normal, no murmur, rub or gallop   Abdomen: Soft, non-tender, no guarding, + bowel sounds active,    Extremities : No obvious swelling,  Neurologic: Cranial Nerves II-XII normal, moves all extremities equally, no focal findings          RADIOLOGY      CT Chest/Abdomen/Pelvis w Contrast   Final Result   IMPRESSION:   1.  Increasing diffuse colonic distention, consistent with [place] syndrome.   2.  Gastroesophageal reflux with fluid distended esophagus. The patient is at risk for aspiration.   3.  Left thyroid 1.2 cm nodule. No further imaging per guidelines below.      REFERENCE:   Alfred MALIK et al. Managing Incidental Thyroid Nodules Detected on Imaging: White Paper of the ACR Incidental Thyroid Findings Committee. JACR 2015; 12:143-150.      Incidental thyroid nodule detected on CT or MRI without suspicious findings. Applies to general population without limited life expectancy or significant comorbidities.      Age greater than or equal to 35 years   Less than 1.5 cm: No further evaluation.   Greater than or equal to 1.5 cm: Evaluate with thyroid ultrasound.      Head CT w/o contrast   Final Result   IMPRESSION:   1.  No acute intracranial abnormality.      2.  Severe burden chronic small vessel ischemic change with a small amount of encephalomalacia in the posterior right temporal lobe. This is accompanied by a moderate diffuse parenchymal volume loss.      3.  Calcified extra-axial mass measuring up to 2.2 cm in diameter in the anterolateral right middle cranial fossa likely reflects a densely calcified meningioma.          EKG     Reviewed        LABS     Results for orders placed or performed during the hospital encounter of 10/13/21   Head CT w/o contrast    Impression    IMPRESSION:  1.  No acute intracranial abnormality.    2.  Severe burden chronic small vessel ischemic change with a small amount of encephalomalacia in the posterior right temporal lobe. This is accompanied by a moderate diffuse parenchymal volume loss.    3.  Calcified extra-axial mass measuring up to 2.2 cm in diameter in the  anterolateral right middle cranial fossa likely reflects a densely calcified meningioma.   CT Chest/Abdomen/Pelvis w Contrast    Impression    IMPRESSION:  1.  Increasing diffuse colonic distention, consistent with [place] syndrome.  2.  Gastroesophageal reflux with fluid distended esophagus. The patient is at risk for aspiration.  3.  Left thyroid 1.2 cm nodule. No further imaging per guidelines below.    REFERENCE:  Alfred MALIK et al. Managing Incidental Thyroid Nodules Detected on Imaging: White Paper of the ACR Incidental Thyroid Findings Committee. JACR 2015; 12:143-150.    Incidental thyroid nodule detected on CT or MRI without suspicious findings. Applies to general population without limited life expectancy or significant comorbidities.    Age greater than or equal to 35 years  Less than 1.5 cm: No further evaluation.  Greater than or equal to 1.5 cm: Evaluate with thyroid ultrasound.   CBC (+ platelets, no diff)   Result Value Ref Range    WBC Count 12.2 (H) 4.0 - 11.0 10e3/uL    RBC Count 4.73 3.80 - 5.20 10e6/uL    Hemoglobin 13.9 11.7 - 15.7 g/dL    Hematocrit 42.0 35.0 - 47.0 %    MCV 89 78 - 100 fL    MCH 29.4 26.5 - 33.0 pg    MCHC 33.1 31.5 - 36.5 g/dL    RDW 14.6 10.0 - 15.0 %    Platelet Count 250 150 - 450 10e3/uL   Basic metabolic panel   Result Value Ref Range    Sodium 141 136 - 145 mmol/L    Potassium 3.1 (L) 3.5 - 5.0 mmol/L    Chloride 109 (H) 98 - 107 mmol/L    Carbon Dioxide (CO2) 21 (L) 22 - 31 mmol/L    Anion Gap 11 5 - 18 mmol/L    Urea Nitrogen 13 8 - 28 mg/dL    Creatinine 0.78 0.60 - 1.10 mg/dL    Calcium 10.1 8.5 - 10.5 mg/dL    Glucose 152 (H) 70 - 125 mg/dL    GFR Estimate 75 >60 mL/min/1.73m2   Hepatic function panel   Result Value Ref Range    Bilirubin Total 0.8 0.0 - 1.0 mg/dL    Bilirubin Direct 0.2 <=0.5 mg/dL    Protein Total 7.3 6.0 - 8.0 g/dL    Albumin 3.5 3.5 - 5.0 g/dL    Alkaline Phosphatase 96 45 - 120 U/L    AST 16 0 - 40 U/L    ALT 11 0 - 45 U/L   Result Value Ref  "Range    Lipase 11 0 - 52 U/L   UA with Microscopic reflex to Culture    Specimen: Urine, Clean Catch   Result Value Ref Range    Color Urine Yellow Colorless, Straw, Light Yellow, Yellow    Appearance Urine Clear Clear    Glucose Urine Negative Negative mg/dL    Bilirubin Urine Negative Negative    Ketones Urine Negative Negative mg/dL    Specific Gravity Urine 1.031 (H) 1.001 - 1.030    Blood Urine Negative Negative    pH Urine 5.5 5.0 - 7.0    Protein Albumin Urine 30  (A) Negative mg/dL    Urobilinogen Urine <2.0 <2.0 mg/dL    Nitrite Urine Negative Negative    Leukocyte Esterase Urine Negative Negative    RBC Urine 1 <=2 /HPF    WBC Urine <1 <=5 /HPF   Result Value Ref Range    INR 1.10 0.85 - 1.15   Result Value Ref Range    aPTT 30 22 - 38 Seconds   Lactic acid whole blood   Result Value Ref Range    Lactic Acid 1.1 0.7 - 2.0 mmol/L   Asymptomatic COVID-19 Virus (Coronavirus) by PCR Nasopharyngeal    Specimen: Nasopharyngeal; Swab   Result Value Ref Range    SARS CoV2 PCR Negative Negative   Extra Blue Top Tube   Result Value Ref Range    Hold Specimen JIC    Extra Red Top Tube   Result Value Ref Range    Hold Specimen JIC    Extra Green Top (Lithium Heparin) Tube   Result Value Ref Range    Hold Specimen JIC    Extra Purple Top Tube   Result Value Ref Range    Hold Specimen JIC    Glucose by meter   Result Value Ref Range    GLUCOSE BY METER POCT 152 (H) 70 - 99 mg/dL       Procedure note:  After getting permission from the patient's sister \"Lydia\" we move forward with a rectal exam and placement of a rectal tube, the procedure move forward atraumatically without evidence of distress from the patient.    A digital rectal exam was performed and I confirmed the patency of the anal canal and rectal vault.  I did not palpate any masses within the rectum.  I then advanced a 28 [place] Malecot tube up into the rectum.  There is gas under pressure which was released.  The tube then was connected to a Watson bag and " filled with air shortly thereafter which we vented and decompressed.  Some stool came out the rectal tube but mostly air.  The tube was cleaned of surrounding lube with a washcloth and was taped to the skin with silk tape.    [facility]  [phone]

## 2021-10-15 ENCOUNTER — LAB REQUISITION (OUTPATIENT)
Dept: LAB | Facility: CLINIC | Age: 76
End: 2021-10-15
Payer: COMMERCIAL

## 2021-10-15 DIAGNOSIS — U07.1 COVID-19: ICD-10-CM

## 2021-10-18 PROCEDURE — U0003 INFECTIOUS AGENT DETECTION BY NUCLEIC ACID (DNA OR RNA); SEVERE ACUTE RESPIRATORY SYNDROME CORONAVIRUS 2 (SARS-COV-2) (CORONAVIRUS DISEASE [COVID-19]), AMPLIFIED PROBE TECHNIQUE, MAKING USE OF HIGH THROUGHPUT TECHNOLOGIES AS DESCRIBED BY CMS-2020-01-R: HCPCS | Mod: ORL | Performed by: INTERNAL MEDICINE

## 2021-10-19 ENCOUNTER — DOCUMENTATION ONLY (OUTPATIENT)
Dept: OTHER | Facility: CLINIC | Age: 76
End: 2021-10-19

## 2021-10-20 ENCOUNTER — LAB REQUISITION (OUTPATIENT)
Dept: LAB | Facility: CLINIC | Age: 76
End: 2021-10-20
Payer: COMMERCIAL

## 2021-10-20 DIAGNOSIS — U07.1 COVID-19: ICD-10-CM

## 2021-10-20 LAB — SARS-COV-2 RNA RESP QL NAA+PROBE: NOT DETECTED

## 2021-10-21 PROCEDURE — U0003 INFECTIOUS AGENT DETECTION BY NUCLEIC ACID (DNA OR RNA); SEVERE ACUTE RESPIRATORY SYNDROME CORONAVIRUS 2 (SARS-COV-2) (CORONAVIRUS DISEASE [COVID-19]), AMPLIFIED PROBE TECHNIQUE, MAKING USE OF HIGH THROUGHPUT TECHNOLOGIES AS DESCRIBED BY CMS-2020-01-R: HCPCS | Mod: ORL | Performed by: INTERNAL MEDICINE

## 2021-10-22 LAB — SARS-COV-2 RNA RESP QL NAA+PROBE: NEGATIVE
